# Patient Record
Sex: FEMALE | Race: WHITE | Employment: FULL TIME | URBAN - METROPOLITAN AREA
[De-identification: names, ages, dates, MRNs, and addresses within clinical notes are randomized per-mention and may not be internally consistent; named-entity substitution may affect disease eponyms.]

---

## 2017-01-03 ENCOUNTER — TELEPHONE (OUTPATIENT)
Dept: DERMATOLOGY CLINIC | Facility: CLINIC | Age: 47
End: 2017-01-03

## 2017-01-03 RX ORDER — AZITHROMYCIN 250 MG/1
TABLET, FILM COATED ORAL
Qty: 6 TABLET | Refills: 0 | Status: SHIPPED | OUTPATIENT
Start: 2017-01-03 | End: 2018-01-08

## 2017-01-03 NOTE — TELEPHONE ENCOUNTER
Pt contacted. States that 5 days prior the bx site to her left thigh developed erythema to the site, center of bx site is wet with yellow, green colored center. Pt denies any drainage that runs out of site. She denies spreading redness from the bx site.

## 2017-01-03 NOTE — TELEPHONE ENCOUNTER
Stop topical atbx  rx sent for po antibiotics,  z-nadia--please lt pt know bx was dysplastic also, mild-moderate dysplasia. Re-excision -30min--would be best ; at least, I would like to re-check in 3-4mos.   Since margins appear involved a re-excision is recc

## 2017-01-03 NOTE — TELEPHONE ENCOUNTER
Patient informed of [id number] [facility] and recommendations with a verbal understanding. Patient changed insurance and may not be covered through [employer] [facility] [employer] She will call us back when she speaks with her insurance comp to make the 30 min excision appt.

## 2017-02-10 ENCOUNTER — TELEPHONE (OUTPATIENT)
Dept: DERMATOLOGY CLINIC | Facility: CLINIC | Age: 47
End: 2017-02-10

## 2017-02-10 RX ORDER — SPIRONOLACTONE 25 MG/1
TABLET ORAL
Qty: 60 TABLET | Refills: 3 | Status: SHIPPED | OUTPATIENT
Start: 2017-02-10 | End: 2017-10-27

## 2017-02-10 NOTE — TELEPHONE ENCOUNTER
LOV 2/10/2017. Pt was rx'd spironolactone 25 mg daily for Hirsutism. Pt reports some improvement and is requesting increase in dosage. Please advise.

## 2017-02-10 NOTE — TELEPHONE ENCOUNTER
Pt's insurance network does not cover [facility] anymore. Pt would like to know if excision can wait until next year when she has a different insurance. Please advise. Thank you.

## 2017-02-11 RX ORDER — LEVOTHYROXINE SODIUM 0.03 MG/1
TABLET ORAL
Qty: 90 TABLET | Refills: 0 | Status: SHIPPED | OUTPATIENT
Start: 2017-02-11 | End: 2017-08-11

## 2017-02-11 NOTE — TELEPHONE ENCOUNTER
Hypothyroid Medications  Protocol Criteria:  Appointment scheduled in the past 12 months or the next 3 months  TSH resulted in the past 12 months that is normal  Recent Visits       Provider Department Primary Dx    1 month ago MD Balta Pavon

## 2017-02-11 NOTE — TELEPHONE ENCOUNTER
Lateral margin with nevus. I would watch this carefully--re-check ideally 6 mos, for sure 1 year or have checked if significant color comes back around the edges in the next 4-5mos. Likely would be no residual nevus on re-excision.

## 2017-03-13 NOTE — TELEPHONE ENCOUNTER
Please f/u on how pt is doing--i just found her more recent call back. Okay to monitor the area carefully.   Recheck next year--ideally would see patient back in 4-6 months however okay to watch area carefully for now

## 2017-03-15 NOTE — TELEPHONE ENCOUNTER
Pt states she is doing well, has no concerns, will observe for now and CB if the lesion changes or grows back - otherwise she will F/U next year

## 2017-04-11 ENCOUNTER — TELEPHONE (OUTPATIENT)
Dept: INTERNAL MEDICINE CLINIC | Facility: CLINIC | Age: 47
End: 2017-04-11

## 2017-04-11 NOTE — TELEPHONE ENCOUNTER
Patient is eligible for [facility] [place] [facility] Annual Health Assessment  Left message to call back [phone].

## 2017-06-15 ENCOUNTER — TELEPHONE (OUTPATIENT)
Dept: INTERNAL MEDICINE CLINIC | Facility: CLINIC | Age: 47
End: 2017-06-15

## 2017-06-15 NOTE — TELEPHONE ENCOUNTER
Patient is eligible for [facility] Annual Health Assessment. Left message to call back [phone].

## 2017-08-11 RX ORDER — LEVOTHYROXINE SODIUM 0.03 MG/1
TABLET ORAL
Qty: 90 TABLET | Refills: 0 | Status: SHIPPED | OUTPATIENT
Start: 2017-08-11 | End: 2017-10-27

## 2017-08-27 RX ORDER — FLUOXETINE HYDROCHLORIDE 20 MG/1
CAPSULE ORAL
Qty: 180 CAPSULE | Refills: 0 | Status: SHIPPED | OUTPATIENT
Start: 2017-08-27 | End: 2017-09-16

## 2017-08-29 RX ORDER — LEVOTHYROXINE SODIUM 0.03 MG/1
TABLET ORAL
Qty: 90 TABLET | Refills: 0 | OUTPATIENT
Start: 2017-08-29

## 2017-09-13 NOTE — TELEPHONE ENCOUNTER
From: Cassy Hardy  Sent: 9/9/2017 7:08 AM CDT  Subject: Medication Renewal Request    Markell AliciaEva Dill would like a refill of the following medications:  FLUoxetine HCl 20 MG Oral Cap Nichelle Conner MD]    Preferred pharmacy: [facility] in [address] [place] [address]    Comment:  A prescription was just renewed, however, I need for the prescription to be for 40mg pills NOT for two 20 mg pills. Would you please submit a revised prescription? The pharmacy is waiting for the changed prescription. Also, NEW PHARMACY. [facility] in [facility]. [phone]. Pls update my chart. Thank you!

## 2017-09-16 RX ORDER — FLUOXETINE HYDROCHLORIDE 40 MG/1
CAPSULE ORAL
Qty: 30 CAPSULE | Refills: 3 | Status: CANCELLED | OUTPATIENT
Start: 2017-09-16

## 2017-10-27 RX ORDER — SPIRONOLACTONE 25 MG/1
TABLET ORAL
Qty: 60 TABLET | Refills: 1 | Status: SHIPPED | OUTPATIENT
Start: 2017-10-27 | End: 2018-01-08

## 2017-10-27 NOTE — TELEPHONE ENCOUNTER
LOV 12-17-16. L/M for pt to pls make f/u before the end of the year. ERX'd x 2 to cover Aldactone thru Dec.  FYI.

## 2017-10-31 RX ORDER — LEVOTHYROXINE SODIUM 0.03 MG/1
TABLET ORAL
Qty: 90 TABLET | Refills: 0 | Status: SHIPPED | OUTPATIENT
Start: 2017-10-31 | End: 2018-02-19

## 2017-10-31 NOTE — TELEPHONE ENCOUNTER
Hypothyroid Medications  Protocol Criteria:  Appointment scheduled in the past 12 months or the next 3 months  TSH resulted in the past 12 months that is normal  Recent Outpatient Visits            10 months ago Neoplasm of uncertain behavior of skin    El

## 2017-11-20 ENCOUNTER — PATIENT MESSAGE (OUTPATIENT)
Dept: INTERNAL MEDICINE CLINIC | Facility: CLINIC | Age: 47
End: 2017-11-20

## 2017-11-20 NOTE — TELEPHONE ENCOUNTER
From: Jesus Mcdaniel  To: Shannan Estrada MD  Sent: 11/20/2017 6:16 AM CST  Subject: Other    Message for Dr. Ketty Pena Dr. When I was in last we spoke about not being able to come in because of insurance purposes until 2018.  Wondering how much to come in be

## 2017-11-21 NOTE — TELEPHONE ENCOUNTER
Pt following up on appt request.  Pt has scheduled appt for Feb 1, 2018 but would like to come in before this year ends. .. please advise     Please reply to pool: JESSICA Mauricio

## 2017-11-24 ENCOUNTER — NURSE TRIAGE (OUTPATIENT)
Dept: OTHER | Age: 47
End: 2017-11-24

## 2017-11-24 NOTE — TELEPHONE ENCOUNTER
Action Requested: Summary for Provider     []  Critical Lab, Recommendations Needed  [x] Need Additional Advice  []   FYI    []   Need Orders  [x] Need Medications Sent to Pharmacy  []  Other     SUMMARY: Asking for abx to pharmacy - cough x 2 weeks     Pa

## 2017-11-24 NOTE — TELEPHONE ENCOUNTER
SUMMARY: Asking for abx to pharmacy - cough x 2 weeks      Patient has upcoming insurance with SHAJI 1/8/18 - currently has no insurance      Reason for call: Cough  Onset: Nov 24, 2017    Associated Symptoms: congestion;cough (greenish/yellowish, states sor

## 2017-11-24 NOTE — TELEPHONE ENCOUNTER
Patient called and notified that abx sent by SHAJI. Noted on the documentation and the documentation was corrected appropriately for onset of two weeks as patient had stated in my conversation with the patient earlier.        Advised patient to call back

## 2017-11-29 RX ORDER — AZITHROMYCIN 250 MG/1
TABLET, FILM COATED ORAL
Qty: 6 TABLET | Refills: 0
Start: 2017-11-29

## 2017-11-30 ENCOUNTER — TELEPHONE (OUTPATIENT)
Dept: OTHER | Age: 47
End: 2017-11-30

## 2017-11-30 NOTE — TELEPHONE ENCOUNTER
Pt calling states she finished Z nadia, but still coughing, states throat tickles a little bit. Asking if there can be anything else she can take for her cough. Please advise. Pharmacy verified.     Please reply to glenn: JESSICA Barrett

## 2017-11-30 NOTE — TELEPHONE ENCOUNTER
Relayed dr sosa, stated she would love to come but INS will not cover til January and she can't afford it   She has an appt 1/8/17  She have been taking Robitussin and have used 1 bottle ,asking if there's another OTC med she can used because she feels

## 2018-01-08 ENCOUNTER — OFFICE VISIT (OUTPATIENT)
Dept: INTERNAL MEDICINE CLINIC | Facility: CLINIC | Age: 48
End: 2018-01-08

## 2018-01-08 VITALS
HEIGHT: 62 IN | SYSTOLIC BLOOD PRESSURE: 153 MMHG | HEART RATE: 64 BPM | BODY MASS INDEX: 35.88 KG/M2 | RESPIRATION RATE: 16 BRPM | WEIGHT: 195 LBS | DIASTOLIC BLOOD PRESSURE: 97 MMHG

## 2018-01-08 DIAGNOSIS — Z00.00 ROUTINE PHYSICAL EXAMINATION: ICD-10-CM

## 2018-01-08 DIAGNOSIS — E78.2 MIXED HYPERLIPIDEMIA: ICD-10-CM

## 2018-01-08 DIAGNOSIS — D22.9 MULTIPLE ATYPICAL NEVI: ICD-10-CM

## 2018-01-08 DIAGNOSIS — E03.9 HYPOTHYROIDISM, UNSPECIFIED TYPE: ICD-10-CM

## 2018-01-08 DIAGNOSIS — J45.991 COUGH VARIANT ASTHMA: ICD-10-CM

## 2018-01-08 DIAGNOSIS — I10 ESSENTIAL HYPERTENSION: ICD-10-CM

## 2018-01-08 DIAGNOSIS — Z12.31 VISIT FOR SCREENING MAMMOGRAM: ICD-10-CM

## 2018-01-08 DIAGNOSIS — D32.0 BENIGN NEOPLASM OF CEREBRAL MENINGES (HCC): Primary | ICD-10-CM

## 2018-01-08 PROCEDURE — 99212 OFFICE O/P EST SF 10 MIN: CPT | Performed by: INTERNAL MEDICINE

## 2018-01-08 PROCEDURE — 99215 OFFICE O/P EST HI 40 MIN: CPT | Performed by: INTERNAL MEDICINE

## 2018-01-08 RX ORDER — FLUTICASONE PROPIONATE AND SALMETEROL 100; 50 UG/1; UG/1
1 POWDER RESPIRATORY (INHALATION) 2 TIMES DAILY
Qty: 1 PACKAGE | Refills: 3 | Status: SHIPPED | OUTPATIENT
Start: 2018-01-08 | End: 2018-02-19

## 2018-01-08 RX ORDER — MONTELUKAST SODIUM 10 MG/1
10 TABLET ORAL NIGHTLY
Qty: 30 TABLET | Refills: 3 | Status: SHIPPED | OUTPATIENT
Start: 2018-01-08 | End: 2018-12-11

## 2018-01-08 RX ORDER — AMLODIPINE BESYLATE 2.5 MG/1
2.5 TABLET ORAL DAILY
Qty: 30 TABLET | Refills: 3 | Status: SHIPPED | OUTPATIENT
Start: 2018-01-08 | End: 2018-02-19

## 2018-01-09 ENCOUNTER — TELEPHONE (OUTPATIENT)
Dept: INTERNAL MEDICINE CLINIC | Facility: CLINIC | Age: 48
End: 2018-01-09

## 2018-01-09 ENCOUNTER — TELEPHONE (OUTPATIENT)
Dept: OPHTHALMOLOGY | Facility: CLINIC | Age: 48
End: 2018-01-09

## 2018-01-09 ENCOUNTER — HOSPITAL ENCOUNTER (OUTPATIENT)
Dept: MAMMOGRAPHY | Age: 48
Discharge: HOME | End: 2018-01-09
Attending: INTERNAL MEDICINE
Payer: COMMERCIAL

## 2018-01-09 DIAGNOSIS — Z12.31 VISIT FOR SCREENING MAMMOGRAM: ICD-10-CM

## 2018-01-09 PROCEDURE — 77067 SCR MAMMO BI INCL CAD: CPT | Performed by: INTERNAL MEDICINE

## 2018-01-09 NOTE — ASSESSMENT & PLAN NOTE
Patient has multiple scattered nevi and a few abnormal ones that were evaluated and recommended for removal but patient has not been able to follow through.   Referral for Dr. Susanna Gottron has been regenerated

## 2018-01-09 NOTE — TELEPHONE ENCOUNTER
Pt states she is looking to come in for an exam due to her right eye giving her problems. Please call. Thank you.

## 2018-01-09 NOTE — PATIENT INSTRUCTIONS
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 after completion         Relevant Orders    ASSAY, THYROID STIM HORMONE    Mixed hyperlipidemia     Patient has been on diet control alone, recheck labs have been ordered.          Relevant Orders    LIPID PANEL    Multiple atypical nevi     Patient has mul

## 2018-01-09 NOTE — TELEPHONE ENCOUNTER
I looked at order and the order is routine. SHAJI also saw it and said it was ok. Pt. Said she would call her insurance to figure out why they are not accepting it.

## 2018-01-09 NOTE — ASSESSMENT & PLAN NOTE
Patient is currently on levothyroxine at 25 mcg once daily and she is overdue for labs, recheck labs have been ordered today and will follow-up after completion

## 2018-01-09 NOTE — PROGRESS NOTES
HPI:    Patient ID: Apolinar Salcedo is a 49year old female. Last mri 8/2016    CONCLUSION:  1. Post surgical changes related to resection of right trigeminal nerve     mass with residual dural enhancement at site of surgical resection     unchanged.  Claryce Forth has had recurring symptoms since then. No fevers or chills, white expectoration. has had sinus pressure and clogged right sinuses after removal of the clear cell meningioma on the right side of the b). The cough is productive of sputum.  Associated symptoms Imiquimod 5 % External Cream Use daily to warts Disp: 24 each Rfl: 2   Cholecalciferol (VITAMIN D3) 5000 UNITS Oral Tab Take by mouth. Disp:  Rfl:    Cyanocobalamin (VITAMIN B 12 OR) Take 3,000 mg by mouth daily.  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   She does have chronic eye da Relevant Orders    ASSAY, THYROID STIM HORMONE    Mixed hyperlipidemia     Patient has been on diet control alone, recheck labs have been ordered.          Relevant Orders    LIPID PANEL    Multiple atypical nevi     Patient has multiple scattered nevi and

## 2018-01-09 NOTE — TELEPHONE ENCOUNTER
Pt states that she is at the [place] location now to get her mammogram done. Pt would like the order to be changed to a routine mammogram not a medical need in order for the insurance to cover it. Please, add the new order in the system.   Transfferred call

## 2018-01-09 NOTE — ASSESSMENT & PLAN NOTE
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   She does have chronic eye damage from the procedure on the right side as well as tingling and numbness on that side of t

## 2018-01-09 NOTE — ASSESSMENT & PLAN NOTE
Patient developed a severe cough and upper respiratory infection sometime in October 2017 and since then she has continued to have nasal congestion, postnasal drip, mild cough with white, clear expectoration as well as chest tightness.   She is being starte

## 2018-01-09 NOTE — ASSESSMENT & PLAN NOTE
New diagnosis of hypertension–blood pressures remained elevated even upon recheck. Consider starting on a low dose of medication like Norvasc at 2.5 mg 1 tablet once daily and come in for a follow-up after labs are completed in about 6 weeks for recheck.

## 2018-01-10 ENCOUNTER — HOSPITAL ENCOUNTER (OUTPATIENT)
Dept: MAMMOGRAPHY | Facility: HOSPITAL | Age: 48
Discharge: HOME | End: 2018-01-10
Attending: INTERNAL MEDICINE
Payer: COMMERCIAL

## 2018-01-10 DIAGNOSIS — R92.8 ABNORMAL MAMMOGRAM: ICD-10-CM

## 2018-01-10 PROCEDURE — 77065 DX MAMMO INCL CAD UNI: CPT | Performed by: INTERNAL MEDICINE

## 2018-01-22 ENCOUNTER — TELEPHONE (OUTPATIENT)
Dept: OPHTHALMOLOGY | Facility: CLINIC | Age: 48
End: 2018-01-22

## 2018-01-22 NOTE — TELEPHONE ENCOUNTER
Pt is requesting to be seen on Feb 19 at 2:30 with Dr. Sarah Ricardo. I offered sooner apt but pt declined. EP/ EE with Dr. Sarah Ricardo.

## 2018-01-22 NOTE — TELEPHONE ENCOUNTER
Patient calling wanting to see If she can get in on 02/19 with [facility] states appt is due to an eye issue that she has would not give any other information. States they usually squeeze her in. Please call. Thank you.

## 2018-01-23 ENCOUNTER — HOSPITAL ENCOUNTER (OUTPATIENT)
Dept: MRI IMAGING | Age: 48
Discharge: HOME | End: 2018-01-23
Attending: INTERNAL MEDICINE
Payer: COMMERCIAL

## 2018-01-23 DIAGNOSIS — D32.0 BENIGN NEOPLASM OF CEREBRAL MENINGES (HCC): ICD-10-CM

## 2018-01-23 PROCEDURE — 70553 MRI BRAIN STEM W/O & W/DYE: CPT | Performed by: INTERNAL MEDICINE

## 2018-01-23 PROCEDURE — A9575 INJ GADOTERATE MEGLUMI 0.1ML: HCPCS | Performed by: INTERNAL MEDICINE

## 2018-01-24 ENCOUNTER — PATIENT MESSAGE (OUTPATIENT)
Dept: INTERNAL MEDICINE CLINIC | Facility: CLINIC | Age: 48
End: 2018-01-24

## 2018-01-24 NOTE — TELEPHONE ENCOUNTER
From: Alycia Mcburney  To: Israel Orr MD  Sent: 1/24/2018 11:25 AM CST  Subject: Test Results Question    I had my MRI yesterday, PLEASE get back to me today. They said it would take a week to be on my chart but you'd see it in 24 hrs.  Very anxious, S

## 2018-02-06 ENCOUNTER — OFFICE VISIT (OUTPATIENT)
Dept: NEUROLOGY | Facility: CLINIC | Age: 48
End: 2018-02-06

## 2018-02-06 VITALS
HEART RATE: 70 BPM | HEIGHT: 62 IN | BODY MASS INDEX: 35.88 KG/M2 | RESPIRATION RATE: 16 BRPM | SYSTOLIC BLOOD PRESSURE: 138 MMHG | DIASTOLIC BLOOD PRESSURE: 70 MMHG | WEIGHT: 195 LBS

## 2018-02-06 DIAGNOSIS — S04.31XD INJURY OF RIGHT TRIGEMINAL NERVE, SUBSEQUENT ENCOUNTER: Primary | ICD-10-CM

## 2018-02-06 PROCEDURE — 99214 OFFICE O/P EST MOD 30 MIN: CPT | Performed by: OTHER

## 2018-02-06 NOTE — PROGRESS NOTES
She has persistent right trigeminal dysesthesias. She will see Dr. Regine Graham for R medial canthus pruritic discomfort. She denies visual distortion. She denies any cranial nerve symptoms besides right facial sensory symptoms. No headache.   No upper, lower

## 2018-02-06 NOTE — PATIENT INSTRUCTIONS
As of October 6th 2014, the Drug Enforcement Agency [facility]) is reclassifying all hydrocodone combination medications from Schedule III to Schedule II. This includes medications such as Norco, Vicodin, Lortab, Zohydro, and Vicoprofen.     What this means for y

## 2018-02-07 ENCOUNTER — OFFICE VISIT (OUTPATIENT)
Dept: DERMATOLOGY CLINIC | Facility: CLINIC | Age: 48
End: 2018-02-07

## 2018-02-07 DIAGNOSIS — D48.5 NEOPLASM OF UNCERTAIN BEHAVIOR OF SKIN: Primary | ICD-10-CM

## 2018-02-07 DIAGNOSIS — Z86.018 HISTORY OF DYSPLASTIC NEVUS: ICD-10-CM

## 2018-02-07 DIAGNOSIS — D23.60 BENIGN NEOPLASM OF SKIN OF UPPER LIMB, INCLUDING SHOULDER, UNSPECIFIED LATERALITY: ICD-10-CM

## 2018-02-07 DIAGNOSIS — D23.4 BENIGN NEOPLASM OF SCALP AND SKIN OF NECK: ICD-10-CM

## 2018-02-07 DIAGNOSIS — D22.9 MULTIPLE NEVI: ICD-10-CM

## 2018-02-07 DIAGNOSIS — D23.5 BENIGN NEOPLASM OF SKIN OF TRUNK, EXCEPT SCROTUM: ICD-10-CM

## 2018-02-07 DIAGNOSIS — D23.70 BENIGN NEOPLASM OF SKIN OF LOWER LIMB, INCLUDING HIP, UNSPECIFIED LATERALITY: ICD-10-CM

## 2018-02-07 DIAGNOSIS — L68.0 HIRSUTISM: ICD-10-CM

## 2018-02-07 DIAGNOSIS — L82.1 SEBORRHEIC KERATOSES: ICD-10-CM

## 2018-02-07 DIAGNOSIS — D23.30 BENIGN NEOPLASM OF SKIN OF FACE: ICD-10-CM

## 2018-02-07 PROCEDURE — 99214 OFFICE O/P EST MOD 30 MIN: CPT | Performed by: DERMATOLOGY

## 2018-02-07 PROCEDURE — 11401 EXC TR-EXT B9+MARG 0.6-1 CM: CPT | Performed by: DERMATOLOGY

## 2018-02-07 PROCEDURE — 88305 TISSUE EXAM BY PATHOLOGIST: CPT | Performed by: DERMATOLOGY

## 2018-02-07 PROCEDURE — 99212 OFFICE O/P EST SF 10 MIN: CPT | Performed by: DERMATOLOGY

## 2018-02-08 NOTE — PROGRESS NOTES
C/o itching at corner of eye occ/paryoxsmal try tobradex. .   Lateral brow elevated/pt c/o upward pulling sensation--constant- maybe botox would help--assymmetry noted--lat brow elevated ~5mm vs left .  increased erythema over r face, pt c/o numbess and ting

## 2018-02-11 NOTE — PROGRESS NOTES
The pathology report from last visit showed   Margins clear , no further atypia . Please log in test results, send biopsy results letter. Pt to rtc for skin check 4-6 mos or prn.

## 2018-02-15 ENCOUNTER — TELEPHONE (OUTPATIENT)
Dept: DERMATOLOGY CLINIC | Facility: CLINIC | Age: 48
End: 2018-02-15

## 2018-02-15 NOTE — TELEPHONE ENCOUNTER
Pt stated tobramycin-dexamethasone 0.3-0.1 % Ophthalmic is too expensive. Pt stated she discussed other options w/KMT. Pt requesting different rx.

## 2018-02-15 NOTE — TELEPHONE ENCOUNTER
rx  Sent for cortisporin opthalmic--this is safe in the eye. Can change to topical cream for skin but, this was really close to the eyelid edge .   This has 1% hydrocortisone so not as strong as the tobradex, but ok to try

## 2018-02-17 ENCOUNTER — LAB ENCOUNTER (OUTPATIENT)
Dept: LAB | Age: 48
End: 2018-02-17
Attending: INTERNAL MEDICINE
Payer: COMMERCIAL

## 2018-02-17 DIAGNOSIS — E78.2 MIXED HYPERLIPIDEMIA: ICD-10-CM

## 2018-02-17 DIAGNOSIS — Z00.00 ROUTINE PHYSICAL EXAMINATION: ICD-10-CM

## 2018-02-17 DIAGNOSIS — E03.9 HYPOTHYROIDISM, UNSPECIFIED TYPE: ICD-10-CM

## 2018-02-17 LAB
ALBUMIN SERPL BCP-MCNC: 3.8 G/DL (ref 3.5–4.8)
ALBUMIN/GLOB SERPL: 1.3 {RATIO} (ref 1–2)
ALP SERPL-CCNC: 62 U/L (ref 32–100)
ALT SERPL-CCNC: 20 U/L (ref 14–54)
ANION GAP SERPL CALC-SCNC: 7 MMOL/L (ref 0–18)
AST SERPL-CCNC: 20 U/L (ref 15–41)
BASOPHILS # BLD: 0 K/UL (ref 0–0.2)
BASOPHILS NFR BLD: 1 %
BILIRUB SERPL-MCNC: 0.9 MG/DL (ref 0.3–1.2)
BILIRUB UR QL: NEGATIVE
BUN SERPL-MCNC: 12 MG/DL (ref 8–20)
BUN/CREAT SERPL: 14.8 (ref 10–20)
CALCIUM SERPL-MCNC: 9 MG/DL (ref 8.5–10.5)
CHLORIDE SERPL-SCNC: 105 MMOL/L (ref 95–110)
CHOLEST SERPL-MCNC: 222 MG/DL (ref 110–200)
CLARITY UR: CLEAR
CO2 SERPL-SCNC: 27 MMOL/L (ref 22–32)
COLOR UR: YELLOW
CREAT SERPL-MCNC: 0.81 MG/DL (ref 0.5–1.5)
EOSINOPHIL # BLD: 0.1 K/UL (ref 0–0.7)
EOSINOPHIL NFR BLD: 1 %
ERYTHROCYTE [DISTWIDTH] IN BLOOD BY AUTOMATED COUNT: 12.7 % (ref 11–15)
GLOBULIN PLAS-MCNC: 2.9 G/DL (ref 2.5–3.7)
GLUCOSE SERPL-MCNC: 91 MG/DL (ref 70–99)
GLUCOSE UR-MCNC: NEGATIVE MG/DL
HCT VFR BLD AUTO: 40 % (ref 35–48)
HDLC SERPL-MCNC: 71 MG/DL
HGB BLD-MCNC: 13.7 G/DL (ref 12–16)
HGB UR QL STRIP.AUTO: NEGATIVE
KETONES UR-MCNC: NEGATIVE MG/DL
LDLC SERPL CALC-MCNC: 134 MG/DL (ref 0–99)
LEUKOCYTE ESTERASE UR QL STRIP.AUTO: NEGATIVE
LYMPHOCYTES # BLD: 1 K/UL (ref 1–4)
LYMPHOCYTES NFR BLD: 23 %
MCH RBC QN AUTO: 30.8 PG (ref 27–32)
MCHC RBC AUTO-ENTMCNC: 34.2 G/DL (ref 32–37)
MCV RBC AUTO: 90.1 FL (ref 80–100)
MONOCYTES # BLD: 0.3 K/UL (ref 0–1)
MONOCYTES NFR BLD: 7 %
NEUTROPHILS # BLD AUTO: 3 K/UL (ref 1.8–7.7)
NEUTROPHILS NFR BLD: 68 %
NITRITE UR QL STRIP.AUTO: NEGATIVE
NONHDLC SERPL-MCNC: 151 MG/DL
OSMOLALITY UR CALC.SUM OF ELEC: 287 MOSM/KG (ref 275–295)
PATIENT FASTING: YES
PH UR: 6 [PH] (ref 5–8)
PLATELET # BLD AUTO: 252 K/UL (ref 140–400)
PMV BLD AUTO: 8.3 FL (ref 7.4–10.3)
POTASSIUM SERPL-SCNC: 4.5 MMOL/L (ref 3.3–5.1)
PROT SERPL-MCNC: 6.7 G/DL (ref 5.9–8.4)
PROT UR-MCNC: NEGATIVE MG/DL
RBC # BLD AUTO: 4.44 M/UL (ref 3.7–5.4)
SODIUM SERPL-SCNC: 139 MMOL/L (ref 136–144)
SP GR UR STRIP: 1.01 (ref 1–1.03)
TRIGL SERPL-MCNC: 85 MG/DL (ref 1–149)
TSH SERPL-ACNC: 2.69 UIU/ML (ref 0.45–5.33)
UROBILINOGEN UR STRIP-ACNC: <2
VIT B12 SERPL-MCNC: 885 PG/ML (ref 181–914)
VIT C UR-MCNC: NEGATIVE MG/DL
WBC # BLD AUTO: 4.4 K/UL (ref 4–11)

## 2018-02-17 PROCEDURE — 80061 LIPID PANEL: CPT

## 2018-02-17 PROCEDURE — 81003 URINALYSIS AUTO W/O SCOPE: CPT

## 2018-02-17 PROCEDURE — 82306 VITAMIN D 25 HYDROXY: CPT

## 2018-02-17 PROCEDURE — 84443 ASSAY THYROID STIM HORMONE: CPT

## 2018-02-17 PROCEDURE — 82607 VITAMIN B-12: CPT

## 2018-02-17 PROCEDURE — 36415 COLL VENOUS BLD VENIPUNCTURE: CPT

## 2018-02-17 PROCEDURE — 85025 COMPLETE CBC W/AUTO DIFF WBC: CPT

## 2018-02-17 PROCEDURE — 80053 COMPREHEN METABOLIC PANEL: CPT

## 2018-02-19 ENCOUNTER — OFFICE VISIT (OUTPATIENT)
Dept: OPHTHALMOLOGY | Facility: CLINIC | Age: 48
End: 2018-02-19

## 2018-02-19 ENCOUNTER — NURSE ONLY (OUTPATIENT)
Dept: DERMATOLOGY CLINIC | Facility: CLINIC | Age: 48
End: 2018-02-19

## 2018-02-19 ENCOUNTER — OFFICE VISIT (OUTPATIENT)
Dept: INTERNAL MEDICINE CLINIC | Facility: CLINIC | Age: 48
End: 2018-02-19

## 2018-02-19 VITALS
DIASTOLIC BLOOD PRESSURE: 85 MMHG | HEART RATE: 81 BPM | SYSTOLIC BLOOD PRESSURE: 130 MMHG | RESPIRATION RATE: 16 BRPM | BODY MASS INDEX: 36.44 KG/M2 | HEIGHT: 62 IN | WEIGHT: 198 LBS

## 2018-02-19 DIAGNOSIS — H16.8 NEUROTROPHIC KERATITIS: Primary | ICD-10-CM

## 2018-02-19 DIAGNOSIS — D51.9 ANEMIA DUE TO VITAMIN B12 DEFICIENCY, UNSPECIFIED B12 DEFICIENCY TYPE: ICD-10-CM

## 2018-02-19 DIAGNOSIS — E55.9 VITAMIN D DEFICIENCY: ICD-10-CM

## 2018-02-19 DIAGNOSIS — H52.13 MYOPIA OF BOTH EYES WITH ASTIGMATISM AND PRESBYOPIA: ICD-10-CM

## 2018-02-19 DIAGNOSIS — H04.123 BILATERAL DRY EYES: ICD-10-CM

## 2018-02-19 DIAGNOSIS — H52.203 MYOPIA OF BOTH EYES WITH ASTIGMATISM AND PRESBYOPIA: ICD-10-CM

## 2018-02-19 DIAGNOSIS — H52.4 MYOPIA OF BOTH EYES WITH ASTIGMATISM AND PRESBYOPIA: ICD-10-CM

## 2018-02-19 DIAGNOSIS — G50.0 TRIGEMINAL NEURALGIA OF RIGHT SIDE OF FACE: Primary | ICD-10-CM

## 2018-02-19 DIAGNOSIS — I10 ESSENTIAL HYPERTENSION: ICD-10-CM

## 2018-02-19 LAB — 25(OH)D3 SERPL-MCNC: 36.1 NG/ML

## 2018-02-19 PROCEDURE — 99212 OFFICE O/P EST SF 10 MIN: CPT | Performed by: INTERNAL MEDICINE

## 2018-02-19 PROCEDURE — 68761 CLOSE TEAR DUCT OPENING: CPT | Performed by: OPHTHALMOLOGY

## 2018-02-19 PROCEDURE — 92015 DETERMINE REFRACTIVE STATE: CPT | Performed by: OPHTHALMOLOGY

## 2018-02-19 PROCEDURE — 99243 OFF/OP CNSLTJ NEW/EST LOW 30: CPT | Performed by: OPHTHALMOLOGY

## 2018-02-19 PROCEDURE — 99214 OFFICE O/P EST MOD 30 MIN: CPT | Performed by: INTERNAL MEDICINE

## 2018-02-19 PROCEDURE — 99212 OFFICE O/P EST SF 10 MIN: CPT | Performed by: OPHTHALMOLOGY

## 2018-02-19 RX ORDER — FLUOXETINE HYDROCHLORIDE 40 MG/1
40 CAPSULE ORAL DAILY
Qty: 90 CAPSULE | Refills: 2 | Status: SHIPPED | OUTPATIENT
Start: 2018-02-19 | End: 2018-11-20

## 2018-02-19 RX ORDER — GABAPENTIN 100 MG/1
100 CAPSULE ORAL 2 TIMES DAILY
Qty: 60 CAPSULE | Refills: 3 | Status: SHIPPED | OUTPATIENT
Start: 2018-02-19 | End: 2019-04-18

## 2018-02-19 RX ORDER — LEVOTHYROXINE SODIUM 0.03 MG/1
TABLET ORAL
Qty: 90 TABLET | Refills: 2 | Status: SHIPPED | OUTPATIENT
Start: 2018-02-19 | End: 2018-11-20

## 2018-02-19 RX ORDER — RAMIPRIL 5 MG/1
5 CAPSULE ORAL DAILY
Qty: 30 CAPSULE | Refills: 3 | Status: SHIPPED | OUTPATIENT
Start: 2018-02-19 | End: 2018-08-12

## 2018-02-19 RX ORDER — PANTOPRAZOLE SODIUM 40 MG/1
40 TABLET, DELAYED RELEASE ORAL
Qty: 30 TABLET | Refills: 0 | Status: SHIPPED | OUTPATIENT
Start: 2018-02-19 | End: 2019-02-19

## 2018-02-19 NOTE — PROGRESS NOTES
Jesus Mcdaniel is a 49year old female. HPI:     CC:  Patient presents with:  Lesion: LOV 12/17/2016. Pt presenting for f/u with lesion to L upper leg. Pt concerned with lesions to L shoulder, L knee, and R ankle. c/o bleeding with lesion to shoulder.  P FLUoxetine HCl 40 MG Oral Cap Take 1 capsule (40 mg total) by mouth daily. Disp: 30 capsule Rfl: 3   Cholecalciferol (VITAMIN D3) 5000 UNITS Oral Tab Take by mouth. Disp:  Rfl:    Cyanocobalamin (VITAMIN B 12 OR) Take 3,000 mg by mouth daily.  Disp:  Rfl: Sleep Concern Yes    Exercise No    Pt has a pacemaker No    Pt has a defibrillator No    Reaction to local anesthetic No     Social History Narrative    The patient does not use an assistive device. .      The patient does not live in a home with stairs. Waxy tannish keratotic papules scattered, cherry-red vascular papules scattered. See map today's date for lesions noted . Hirsutism upper lip chin jawline.   Epidermal nevus right jawline multicolor 5 x 6 mm papule right shoulder papule 1 cm cerebrifo reassurance. Hirsutism remains. Discussed treatment options laser myriam Aldactone trial of Aldactone consider laser Pathophysiology discussed with patient. Therapeutic options reviewed. See  Medications in grid. Instructions reviewed at length.

## 2018-02-19 NOTE — PATIENT INSTRUCTIONS
Neurotrophic keratitis- right eye  Continue artificial tears 5-6 times a day in the right eye. Continue Systane gel at bedtime in the right eye. Collagen plug inserted in lower punctum in office today. Recent brain MRI was stable.      Myopia of bot

## 2018-02-19 NOTE — PROGRESS NOTES
Beba Muhammad is a 49year old female. HPI:     HPI     EP/ 49 yr old here for a complete exam. LDE 1/22/16 with Hx of myopia, astigmatism, presbyopia OU and neurotropic keratitis OD. Pt had a punctal plug inserted in her RLL.  Pt is still using OTC t Prescriptions:  Bacitracin-Polymyx-Herber-HC 1 % Ophthalmic Ointment Use tid to eye lid as dir Disp: 3.5 g Rfl: 3   AmLODIPine Besylate 2.5 MG Oral Tab Take 1 tablet (2.5 mg total) by mouth daily.  Disp: 30 tablet Rfl: 3   fluticasone-salmeterol (ADVAIR DISKUS C/D Ratio 0.1 0.1    Macula Normal Normal    Vessels Normal Normal    Periphery Normal Normal            Refraction     Wearing Rx       Sphere Cylinder Axis    Right -5.25 +0.50 110    Left -6.00 +0.50 070    Type:  1/22/16 DISTANCE Rx          Manifest R by: Dakota Doe MD

## 2018-02-19 NOTE — ASSESSMENT & PLAN NOTE
Continue artificial tears 5-6 times a day in the right eye. Continue Systane gel at bedtime in the right eye. Collagen plug inserted in lower punctum in office today. Recent brain MRI was stable.

## 2018-02-20 NOTE — ASSESSMENT & PLAN NOTE
This has been supplemented in the past, recent labs showed normal vitamin D levels advised to take an over-the-counter vitamin D 2000 units daily.

## 2018-02-20 NOTE — PATIENT INSTRUCTIONS
Problem List Items Addressed This Visit        Unprioritized    Essential hypertension     Blood pressure 130/85, pulse 81, resp. rate 16, height 5' 2\" (1.575 m), weight 198 lb (89.8 kg), not currently breastfeeding.   Blood pressures seem well controlled

## 2018-02-20 NOTE — ASSESSMENT & PLAN NOTE
Pain, tingling, numbness on the right side of the face with itching in the eyes, dry eyes that has been gradually worse over the past few months. Initially experienced once every 6 weeks but now about 2-3 times every 2-3 weeks.   She has not had any headac

## 2018-02-20 NOTE — ASSESSMENT & PLAN NOTE
B12 levels per recent labs looked great.   Advised to continue on an over-the-counter vitamin B12 at 1000 mcg once daily sublingual.

## 2018-02-20 NOTE — PROGRESS NOTES
HPI:    Patient ID: Stella Latif is a 49year old female. Per dr Leticia Saunders    Progress Notes          She has persistent right trigeminal dysesthesias. She will see Dr. Macario Canales for R medial canthus pruritic discomfort. She denies visual distortion.   Sh malaise/fatigue. HENT: Positive for congestion. R facial pain,itching in the corner of the eye   Eyes: Negative. Respiratory: Negative. Negative for shortness of breath. Cardiovascular: Negative. Negative for chest pain and palpitations. eye exhibits no discharge. Left eye exhibits no discharge. Neck: Normal range of motion. Neck supple. No JVD present. No thyromegaly present. Cardiovascular: Normal rate, regular rhythm, normal heart sounds and intact distal pulses. No murmur heard. eyes, dry eyes that has been gradually worse over the past few months. Initially experienced once every 6 weeks but now about 2-3 times every 2-3 weeks. She has not had any headaches at this time. Vision has remained stable.   MRI of the brain did not sh

## 2018-08-12 NOTE — TELEPHONE ENCOUNTER
Hypertensive Medications  Protocol Criteria:  · Appointment scheduled in the past 6 months or in the next 3 months  · BMP or CMP in the past 12 months  · Creatinine result < 2  Recent Outpatient Visits            5 months ago     [address]

## 2018-08-14 RX ORDER — RAMIPRIL 5 MG/1
CAPSULE ORAL
Qty: 90 CAPSULE | Refills: 1 | Status: SHIPPED | OUTPATIENT
Start: 2018-08-14 | End: 2019-03-16

## 2018-10-08 ENCOUNTER — OFFICE VISIT (OUTPATIENT)
Dept: OPHTHALMOLOGY | Facility: CLINIC | Age: 48
End: 2018-10-08

## 2018-10-08 DIAGNOSIS — H52.13 MYOPIA OF BOTH EYES WITH ASTIGMATISM AND PRESBYOPIA: ICD-10-CM

## 2018-10-08 DIAGNOSIS — H16.8 NEUROTROPHIC KERATITIS: Primary | ICD-10-CM

## 2018-10-08 DIAGNOSIS — H16.8 NEUROTROPHIC KERATITIS: ICD-10-CM

## 2018-10-08 DIAGNOSIS — H52.203 MYOPIA OF BOTH EYES WITH ASTIGMATISM AND PRESBYOPIA: ICD-10-CM

## 2018-10-08 DIAGNOSIS — H04.123 BILATERAL DRY EYES: ICD-10-CM

## 2018-10-08 DIAGNOSIS — H52.4 MYOPIA OF BOTH EYES WITH ASTIGMATISM AND PRESBYOPIA: ICD-10-CM

## 2018-10-08 PROCEDURE — 99242 OFF/OP CONSLTJ NEW/EST SF 20: CPT | Performed by: OPHTHALMOLOGY

## 2018-10-08 PROCEDURE — 92015 DETERMINE REFRACTIVE STATE: CPT | Performed by: OPHTHALMOLOGY

## 2018-10-08 PROCEDURE — 99212 OFFICE O/P EST SF 10 MIN: CPT | Performed by: OPHTHALMOLOGY

## 2018-10-08 PROCEDURE — 68761 CLOSE TEAR DUCT OPENING: CPT | Performed by: OPHTHALMOLOGY

## 2018-10-08 NOTE — PATIENT INSTRUCTIONS
Neurotrophic keratitis- right eye  Collagen punctal plug inserted in right lower punctum    Myopia of both eyes with astigmatism and presbyopia  New glasses and contacts    Bilateral dry eyes  Artificial tears  as needed

## 2018-10-08 NOTE — PROGRESS NOTES
Sam Eye is a 51year old female.     HPI:     HPI     Consult      Additional comments: Consult per Dr. Mellissa Ibarra     EP/ 51year old here for a complete exam.  LDE was 2/19/18 with a hx of neurotropic keratitis OD, myopia with a Smoking status: Never Smoker      Smokeless tobacco: Never Used    Alcohol use: No      Alcohol/week: 0.0 oz    Drug use: No      Medications:    Current Outpatient Medications:  RAMIPRIL 5 MG Oral Cap TAKE ONE CAPSULE BY MOUTH ONE TIME DAILY  Disp: 90 cap Anterior Chamber Deep and quiet Deep and quiet    Iris Normal Normal    Lens Clear Clear    Vitreous Clear Clear          Fundus Exam       Right Left    Disc Normal Normal    C/D Ratio 0.1 0.1    Macula Normal Normal    Undilated            Refraction

## 2018-10-09 ENCOUNTER — OFFICE VISIT (OUTPATIENT)
Dept: OPHTHALMOLOGY | Facility: CLINIC | Age: 48
End: 2018-10-09

## 2018-10-09 ENCOUNTER — TELEPHONE (OUTPATIENT)
Dept: OPHTHALMOLOGY | Facility: CLINIC | Age: 48
End: 2018-10-09

## 2018-10-09 DIAGNOSIS — H16.8 NEUROTROPHIC KERATITIS: Primary | ICD-10-CM

## 2018-10-09 NOTE — ASSESSMENT & PLAN NOTE
Right punctal plug which was inserted yesterday afternoon, came out when patient rubbed her eye during one of her facial pain episodes last night. New plug was reinserted into Right lower punctum and pushed down into canaliculus.  Patient left in good cond

## 2018-10-09 NOTE — PROGRESS NOTES
Moon David is a 51year old female. HPI:     HPI     Pt was seen yesterday 10/8/18 by Dr. Annia Ramos for punctal plugs. Pt called today stating the plug came out. Pt was seen today by Dr. Annia Ramos in the office.    LDE 2/19/18 with Hx of hx of neurotropi TAKE ONE CAPSULE BY MOUTH ONE TIME DAILY  Disp: 90 capsule Rfl: 1   Levothyroxine Sodium 25 MCG Oral Tab TAKE ONE TABLET BY MOUTH ONCE DAILY BEFORE BREAKFAST Disp: 90 tablet Rfl: 2   FLUoxetine HCl 40 MG Oral Cap Take 1 capsule (40 mg total) by mouth daily Can Luz MD

## 2018-10-09 NOTE — TELEPHONE ENCOUNTER
Per Dr. Tabby Cowan pt should come at 2:30. Pt states that the plug came out from yesterday and is able to come back today for Dr Tabby Cowan to put it back in. Spoke to pt and she will be here at 2:30.

## 2018-10-11 ENCOUNTER — OFFICE VISIT (OUTPATIENT)
Dept: OBGYN CLINIC | Facility: CLINIC | Age: 48
End: 2018-10-11

## 2018-10-11 VITALS
BODY MASS INDEX: 37.1 KG/M2 | SYSTOLIC BLOOD PRESSURE: 118 MMHG | HEIGHT: 62 IN | HEART RATE: 62 BPM | DIASTOLIC BLOOD PRESSURE: 82 MMHG | WEIGHT: 201.63 LBS

## 2018-10-11 DIAGNOSIS — Z01.419 WELL WOMAN EXAM WITH ROUTINE GYNECOLOGICAL EXAM: Primary | ICD-10-CM

## 2018-10-11 DIAGNOSIS — Z12.4 SCREENING FOR MALIGNANT NEOPLASM OF CERVIX: ICD-10-CM

## 2018-10-11 PROCEDURE — 99396 PREV VISIT EST AGE 40-64: CPT | Performed by: CLINICAL NURSE SPECIALIST

## 2018-10-15 NOTE — PROGRESS NOTES
Buddy Cazares is a 51year old female [id number] Patient's last menstrual period was 09/26/2018. Patient presents with:  Gyn Exam: annual exam  Last annual exam and pap was 2016. Pap was normal, HPV negative. Denies hx of abnormal pap's.  Periods are regula non-medical: Not on file    Occupational History      Not on file    Tobacco Use      Smoking status: Never Smoker      Smokeless tobacco: Never Used    Substance and Sexual Activity      Alcohol use: No        Alcohol/week: 0.0 oz      Drug use: No      S DAILY BEFORE BREAKFAST, Disp: 90 tablet, Rfl: 2  •  FLUoxetine HCl 40 MG Oral Cap, Take 1 capsule (40 mg total) by mouth daily. , Disp: 90 capsule, Rfl: 2  •  gabapentin 100 MG Oral Cap, Take 1 capsule (100 mg total) by mouth 2 (two) times daily. , Disp: 60 Oriented to time, place, person and situation. Appropriate mood and affect    Pelvic Exam:  External Genitalia: normal hair distribution. Bilateral labia minora with areas of hyperpigmented skin.    Urethral Meatus:  normal in size, location, without lesion

## 2018-11-16 ENCOUNTER — NURSE TRIAGE (OUTPATIENT)
Dept: INTERNAL MEDICINE CLINIC | Facility: CLINIC | Age: 48
End: 2018-11-16

## 2018-11-16 NOTE — TELEPHONE ENCOUNTER
Received routing message from call center pt is asking to speak to doctor, was offered appt with available provider but stated she couldn't come in today since she has kids. Please advise.

## 2018-11-16 NOTE — TELEPHONE ENCOUNTER
Action Requested: Summary for Provider     []  Critical Lab, Recommendations Needed  [] Need Additional Advice  []   FYI    []   Need Orders  [x] Need Medications Sent to Pharmacy  []  Other     SUMMARY: Patient is requesting if medication for cough can be

## 2018-11-17 NOTE — TELEPHONE ENCOUNTER
I called and spoke to the knees. She states the patient was unavailable. Niece stated she was having a cough and runny nose for 3 weeks. No fevers or chills. No other symptoms.   Advised that I could not do much over the phone and I did not feel comfort

## 2018-11-20 RX ORDER — LEVOTHYROXINE SODIUM 0.03 MG/1
TABLET ORAL
Qty: 90 TABLET | Refills: 0 | Status: SHIPPED | OUTPATIENT
Start: 2018-11-20 | End: 2018-12-12

## 2018-11-21 RX ORDER — FLUOXETINE HYDROCHLORIDE 40 MG/1
CAPSULE ORAL
Qty: 90 CAPSULE | Refills: 1 | Status: SHIPPED | OUTPATIENT
Start: 2018-11-21 | End: 2019-08-25

## 2018-11-21 NOTE — TELEPHONE ENCOUNTER
Please review; protocol failed.   Refill Protocol Appointment Criteria  · Appointment scheduled in the past 6 months or in the next 3 months  Recent Outpatient Visits            1 month ago Well woman exam with routine gynecological exam    [facility], [facility] Sarahi Mejia MD    Office Visit        Future Appointments       Provider Department Appt Notes    In 1 month Anthony Dk, [address] [facility], [facility] [place] Non-sx NO REFERRAL IN THE SYSTEM  [id number]

## 2018-12-06 ENCOUNTER — OFFICE VISIT (OUTPATIENT)
Dept: OBGYN CLINIC | Facility: CLINIC | Age: 48
End: 2018-12-06

## 2018-12-06 VITALS
DIASTOLIC BLOOD PRESSURE: 81 MMHG | BODY MASS INDEX: 35 KG/M2 | SYSTOLIC BLOOD PRESSURE: 118 MMHG | HEART RATE: 71 BPM | WEIGHT: 190.63 LBS

## 2018-12-06 DIAGNOSIS — N90.89 VULVAR LESION: Primary | ICD-10-CM

## 2018-12-06 DIAGNOSIS — L98.9 DISORDER OF SKIN OR SUBCUTANEOUS TISSUE: ICD-10-CM

## 2018-12-06 PROCEDURE — 56605 BIOPSY OF VULVA/PERINEUM: CPT | Performed by: CLINICAL NURSE SPECIALIST

## 2018-12-07 NOTE — PROCEDURES
Vulvar Biopsy     Pregnancy Results: n/a  Birth control method(s) used: None. Consent signed. Procedure discussed with patient in detail including indication, risk, benefits, alternatives and complications.     Procedure:  Betadine wash of procedural sit

## 2018-12-10 ENCOUNTER — TELEPHONE (OUTPATIENT)
Dept: OBGYN CLINIC | Facility: CLINIC | Age: 48
End: 2018-12-10

## 2018-12-10 ENCOUNTER — TELEPHONE (OUTPATIENT)
Dept: DERMATOLOGY CLINIC | Facility: CLINIC | Age: 48
End: 2018-12-10

## 2018-12-10 NOTE — TELEPHONE ENCOUNTER
Pt states wants to speak to rn. PCP is sending a message to [facility] regarding getting in for an appt asap.  I offered appt asap to the patient but wants to speak to rn first. Please call

## 2018-12-10 NOTE — TELEPHONE ENCOUNTER
LOV 2/7/18 pt just saw YAZ OSPINA for a vulvar lesion that was biopsied - results are in as pigmented seborrheic keratosis - pt states the [facility] was sending an email to you regarding seeing this pt sooner than next available?  I do not see any emails but pl

## 2018-12-10 NOTE — TELEPHONE ENCOUNTER
Spoke with pt on telephone and reviewed pathology report. Pt aware pigmented seborrheic keratosis is benign but not often found in vulva. Id like her to see Derm just for an opinion on monitoring and/or treatment. Pt verbalized understanding.  Has seen

## 2018-12-11 ENCOUNTER — OFFICE VISIT (OUTPATIENT)
Dept: INTERNAL MEDICINE CLINIC | Facility: CLINIC | Age: 48
End: 2018-12-11

## 2018-12-11 VITALS
WEIGHT: 188 LBS | BODY MASS INDEX: 34 KG/M2 | TEMPERATURE: 98 F | DIASTOLIC BLOOD PRESSURE: 86 MMHG | HEART RATE: 76 BPM | SYSTOLIC BLOOD PRESSURE: 127 MMHG

## 2018-12-11 DIAGNOSIS — I10 ESSENTIAL HYPERTENSION: Primary | ICD-10-CM

## 2018-12-11 DIAGNOSIS — R05.9 COUGH: ICD-10-CM

## 2018-12-11 PROCEDURE — 99213 OFFICE O/P EST LOW 20 MIN: CPT | Performed by: PHYSICIAN ASSISTANT

## 2018-12-11 PROCEDURE — 99212 OFFICE O/P EST SF 10 MIN: CPT | Performed by: PHYSICIAN ASSISTANT

## 2018-12-11 RX ORDER — AZITHROMYCIN 250 MG/1
TABLET, FILM COATED ORAL
Qty: 6 TABLET | Refills: 0 | Status: SHIPPED | OUTPATIENT
Start: 2018-12-11 | End: 2019-04-18 | Stop reason: ALTCHOICE

## 2018-12-11 RX ORDER — CODEINE PHOSPHATE AND GUAIFENESIN 10; 100 MG/5ML; MG/5ML
SOLUTION ORAL EVERY 6 HOURS PRN
Qty: 236 ML | Refills: 0 | Status: SHIPPED | OUTPATIENT
Start: 2018-12-11 | End: 2019-04-18 | Stop reason: ALTCHOICE

## 2018-12-11 NOTE — TELEPHONE ENCOUNTER
Reviewed info from [facility]. Please have pt schedule appt ok to add in-would plan on evaluation based on path report this should not need further surgery ( seborrheic keratosis).

## 2018-12-11 NOTE — PROGRESS NOTES
HPI:    Patient ID: Beba Muhammad is a 51year old female. HPI   Patient presents today complaining of cough for the last 2 monhts. Cough is productive of clear sputum at this point but has been other colors throughout the duration of her symptoms. MOUTH ONE TIME DAILY  Disp: 90 capsule Rfl: 1   gabapentin 100 MG Oral Cap Take 1 capsule (100 mg total) by mouth 2 (two) times daily.  Disp: 60 capsule Rfl: 3   Pantoprazole Sodium 40 MG Oral Tab EC Take 1 tablet (40 mg total) by mouth every morning before given duration of symptoms. Guaifenesin–codeine 1-2 teaspoons at bedtime as needed, may cause drowsiness. Start on over-the-counter antihistamine once daily. Contact the office if symptoms worsen or do not improve in the coming days.      No orders of th

## 2018-12-12 ENCOUNTER — TELEPHONE (OUTPATIENT)
Dept: OBGYN CLINIC | Facility: CLINIC | Age: 48
End: 2018-12-12

## 2018-12-12 RX ORDER — LEVOTHYROXINE SODIUM 0.03 MG/1
TABLET ORAL
Qty: 90 TABLET | Refills: 0 | Status: SHIPPED | OUTPATIENT
Start: 2018-12-12 | End: 2019-08-27

## 2018-12-13 NOTE — TELEPHONE ENCOUNTER
Refill passed per [facility], [facility] protocol.   Hypothyroid Medications  Protocol Criteria:  Appointment scheduled in the past 12 months or the next 3 months  TSH resulted in the past 12 months that is normal  Recent Outpatient Visits            Yesterday Essential hypertension    [facility]    Office Visit    6 days ago Vulvar lesion    [facility] [place], Kiki, Σ[place], APRN    Office Visit    2 months ago Well woman exam with routine gynecological exam    [facility] [place], Kiki, ΣΑ[place]ΝΤΙ, APRN    Office Visit    2 months ago Neurotrophic keratitis- right eye    [facility] Kalli Kumari MD    Office Visit    2 months ago Neurotrophic keratitis- right eye    [facility] Kalli Kumari MD    Office Visit        Future Appointments       Provider Department Appt Notes    In 1 week Jane Roman MD [facility] Dermatology lesion    In 2 weeks Li Siddiqui MD [address] [place] [address] Non-sx NO REFERRAL IN THE SYSTEM  [facility] BLUE PRECISION O - UWV50 BP HMO        Lab Results   Component Value Date    TSH 2.69 02/17/2018    [facility] 3.97 01/22/2016

## 2018-12-17 ENCOUNTER — TELEPHONE (OUTPATIENT)
Dept: INTERNAL MEDICINE CLINIC | Facility: CLINIC | Age: 48
End: 2018-12-17

## 2018-12-17 DIAGNOSIS — L98.9 SKIN LESION: Primary | ICD-10-CM

## 2018-12-17 NOTE — TELEPHONE ENCOUNTER
Pt was added to our schedule tomorrow 12/18 with Dr. Aspen Corado for a skin lesion. A referral is required.  Please advise

## 2018-12-17 NOTE — TELEPHONE ENCOUNTER
Please sign referral if you agree.  Thank you, Inderjit [facility]

## 2018-12-19 ENCOUNTER — OFFICE VISIT (OUTPATIENT)
Dept: DERMATOLOGY CLINIC | Facility: CLINIC | Age: 48
End: 2018-12-19

## 2018-12-19 DIAGNOSIS — D23.70 BENIGN NEOPLASM OF SKIN OF LOWER LIMB, INCLUDING HIP, UNSPECIFIED LATERALITY: ICD-10-CM

## 2018-12-19 DIAGNOSIS — D23.5 BENIGN NEOPLASM OF SKIN OF TRUNK, EXCEPT SCROTUM: ICD-10-CM

## 2018-12-19 DIAGNOSIS — D23.60 BENIGN NEOPLASM OF SKIN OF UPPER LIMB, INCLUDING SHOULDER, UNSPECIFIED LATERALITY: ICD-10-CM

## 2018-12-19 DIAGNOSIS — D23.4 BENIGN NEOPLASM OF SCALP AND SKIN OF NECK: ICD-10-CM

## 2018-12-19 DIAGNOSIS — D22.9 MULTIPLE NEVI: ICD-10-CM

## 2018-12-19 DIAGNOSIS — L82.1 SEBORRHEIC KERATOSES: Primary | ICD-10-CM

## 2018-12-19 DIAGNOSIS — Z86.018 HISTORY OF DYSPLASTIC NEVUS: ICD-10-CM

## 2018-12-19 DIAGNOSIS — D23.30 BENIGN NEOPLASM OF SKIN OF FACE: ICD-10-CM

## 2018-12-19 PROCEDURE — 99214 OFFICE O/P EST MOD 30 MIN: CPT | Performed by: DERMATOLOGY

## 2018-12-19 PROCEDURE — 99212 OFFICE O/P EST SF 10 MIN: CPT | Performed by: DERMATOLOGY

## 2018-12-31 NOTE — PROGRESS NOTES
Rosario Edmond is a 51year old female.   HPI:     CC:  Patient presents with:  Derm Problem: established pt. seen by APN for a lesion to vulva, biopsied and revelaed a pigmented seborrheic keratosis - pt presents here today for F/U to see if anything nee Medications:  Levothyroxine Sodium 25 MCG Oral Tab TAKE ONE TABLET BY MOUTH EVERY DAY IN THE MORNING BEFORE BREAKFAST Disp: 90 tablet Rfl: 0   FLUOXETINE HCL 40 MG Oral Cap TAKE ONE CAPSULE BY MOUTH ONE TIME DAILY  Disp: 90 capsule Rfl: 1   RAMIPRIL 5 MG O education: Not on file      Highest education level: Not on file    Social Needs      Financial resource strain: Not on file      Food insecurity - worry: Not on file      Food insecurity - inability: Not on file      Transportation needs - medical: Not on Disorder Father    • Other (Other) Father         COPD       There were no vitals filed for this visit.     HPI:    Patient presents with:  Derm Problem: established pt. seen by APN for a lesion to vulva, biopsied and revelaed a pigmented seborrheic keratos cm cerebriform at left breast.  Junctional nevi mid abdomen. 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 Multiple junctional nevi over anterior thighs.   Darker black brown papule at left posterior thigh most irregular lesion largest 5 x 6 mm clus right lower abdomen pubic area recommend observation. No significant change    Cerebriform papule at left breast observe. Nevus at right shoulder observe. Epidermal nevus reassurance. Hirsutism stable.   Discussed treatment options tash Amador

## 2019-01-25 ENCOUNTER — TELEPHONE (OUTPATIENT)
Dept: INTERNAL MEDICINE CLINIC | Facility: CLINIC | Age: 49
End: 2019-01-25

## 2019-01-25 ENCOUNTER — TELEPHONE (OUTPATIENT)
Dept: OPHTHALMOLOGY | Facility: CLINIC | Age: 49
End: 2019-01-25

## 2019-01-25 ENCOUNTER — TELEPHONE (OUTPATIENT)
Dept: OTHER | Age: 49
End: 2019-01-25

## 2019-01-25 ENCOUNTER — OFFICE VISIT (OUTPATIENT)
Dept: OPHTHALMOLOGY | Facility: CLINIC | Age: 49
End: 2019-01-25

## 2019-01-25 DIAGNOSIS — H52.13 MYOPIA OF BOTH EYES WITH ASTIGMATISM AND PRESBYOPIA: ICD-10-CM

## 2019-01-25 DIAGNOSIS — H57.9 DIFFICULTY SEEING: Primary | ICD-10-CM

## 2019-01-25 DIAGNOSIS — H16.8 NEUROTROPHIC KERATITIS: Primary | ICD-10-CM

## 2019-01-25 DIAGNOSIS — H04.123 BILATERAL DRY EYES: ICD-10-CM

## 2019-01-25 DIAGNOSIS — H54.61 VISION LOSS OF RIGHT EYE: Primary | ICD-10-CM

## 2019-01-25 DIAGNOSIS — H52.203 MYOPIA OF BOTH EYES WITH ASTIGMATISM AND PRESBYOPIA: ICD-10-CM

## 2019-01-25 DIAGNOSIS — H52.4 MYOPIA OF BOTH EYES WITH ASTIGMATISM AND PRESBYOPIA: ICD-10-CM

## 2019-01-25 PROCEDURE — 99243 OFF/OP CNSLTJ NEW/EST LOW 30: CPT | Performed by: OPHTHALMOLOGY

## 2019-01-25 PROCEDURE — 99212 OFFICE O/P EST SF 10 MIN: CPT | Performed by: OPHTHALMOLOGY

## 2019-01-25 RX ORDER — OFLOXACIN 3 MG/ML
1 SOLUTION/ DROPS OPHTHALMIC 4 TIMES DAILY
Qty: 1 BOTTLE | Refills: 0 | Status: SHIPPED | OUTPATIENT
Start: 2019-01-25 | End: 2019-03-20

## 2019-01-25 NOTE — TELEPHONE ENCOUNTER
Dr Vangie Mas, Pt called requesting a referral to see Dr Darrin Rush regarding R eye numbness and difficulty seeing with R eye. Advised pt to speak with one of your nurses and transferred call to the nurses line.  Pt is attempting to have an appointment with Dr Lopez Heading

## 2019-01-25 NOTE — TELEPHONE ENCOUNTER
Pt states that she is not seeing well out of her right eye. This just started this morning. Please advise.

## 2019-01-25 NOTE — ASSESSMENT & PLAN NOTE
Central horizontal linear abrasion decreasing vision. No pain due to corneal anesthesia. 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 Stable since then. Start Ofloxacin drops 4 times a day in the right eye and call on Monday.  Keep

## 2019-01-25 NOTE — PATIENT INSTRUCTIONS
Neurotrophic keratitis- right eye  Start Ofloxacin drops 4 times a day in the right eye and return on Monday at 4:30.

## 2019-01-25 NOTE — TELEPHONE ENCOUNTER
Pt calling with a sense of urgency in her voice requesting ophthalmology referral ASAP. States woke up this morning with vision loss and has a H/O brain tumor and nerve damage to right side of head.   Called Dr Hansen's office who will see her today but ne

## 2019-01-25 NOTE — PROGRESS NOTES
Gunnar Haines is a 51year old female. HPI:     HPI     Consult      Additional comments: Consult per Dr. Carla Antunez     EP/ 50 yo F here for urgent visit. LDE: 2/19/18   Patient complains of vision loss OD since this morning.   Jeaneth Robles Smoking status: Never Smoker      Smokeless tobacco: Never Used    Alcohol use: No      Alcohol/week: 0.0 oz    Drug use: No      Medications:    Current Outpatient Medications:  ofloxacin 0.3 % Ophthalmic Solution Place 1 drop into the right eye 4 (four) staining central epithal defect in the center  trace staining     Anterior Chamber Deep and quiet Deep and quiet    Iris Normal Normal    Lens Clear Clear    Vitreous Clear Clear            Refraction     Wearing Rx       Sphere Cylinder Axis Add    Right

## 2019-01-28 ENCOUNTER — OFFICE VISIT (OUTPATIENT)
Dept: OPHTHALMOLOGY | Facility: CLINIC | Age: 49
End: 2019-01-28

## 2019-01-28 DIAGNOSIS — H52.4 MYOPIA OF BOTH EYES WITH ASTIGMATISM AND PRESBYOPIA: ICD-10-CM

## 2019-01-28 DIAGNOSIS — H52.13 MYOPIA OF BOTH EYES WITH ASTIGMATISM AND PRESBYOPIA: ICD-10-CM

## 2019-01-28 DIAGNOSIS — G50.0 TRIGEMINAL NEURALGIA OF RIGHT SIDE OF FACE: ICD-10-CM

## 2019-01-28 DIAGNOSIS — H16.8 NEUROTROPHIC KERATITIS: Primary | ICD-10-CM

## 2019-01-28 DIAGNOSIS — H52.203 MYOPIA OF BOTH EYES WITH ASTIGMATISM AND PRESBYOPIA: ICD-10-CM

## 2019-01-28 DIAGNOSIS — H04.123 BILATERAL DRY EYES: ICD-10-CM

## 2019-01-28 PROCEDURE — 92012 INTRM OPH EXAM EST PATIENT: CPT | Performed by: OPHTHALMOLOGY

## 2019-01-28 PROCEDURE — 68761 CLOSE TEAR DUCT OPENING: CPT | Performed by: OPHTHALMOLOGY

## 2019-01-28 NOTE — ASSESSMENT & PLAN NOTE
Punctal plug inserted inferiorly. Continue Ofloxacin 2 times a day for 5 days. Artificial tears also 4 times a day, not following Ofloxacin. Having more episode of eye pain. Trigeminal neuralgia. Follow up with Dr. Panfilo Hudson.

## 2019-01-28 NOTE — ASSESSMENT & PLAN NOTE
Episodes of pain are getting more frequent. Can follow up with Dr. Jacobo Jacobs. Gabapentin prescribed by Dr. Jacobo Jacobs last year,but patient did not take it.

## 2019-01-28 NOTE — PATIENT INSTRUCTIONS
Bilateral dry eyes  Continue artificial tears 4 times a day Right eye    Neurotrophic keratitis- right eye  Punctal plug inserted inferiorly. Continue Ofloxacin 2 times a day for 5 days. Artificial tears also 4 times a day, not following Ofloxacin.  Having

## 2019-01-29 NOTE — PROGRESS NOTES
Buddy Cazares is a 51year old female. HPI:     HPI     EP/ 52 yo F here for a 3 day recheck of neurotrophic right cornea with corneal abrasion. She was last seen on 1/25/19 and was started on Ofloxacin qid OD.    POH: CN 5 paresis from brain tumor- Smoker      Smokeless tobacco: Never Used    Alcohol use: No      Alcohol/week: 0.0 oz    Drug use: No      Medications:    Current Outpatient Medications:  ofloxacin 0.3 % Ophthalmic Solution Place 1 drop into the right eye 4 (four) times daily.  Disp: 1 B ASSESSMENT/PLAN:     Diagnoses and Plan:     Bilateral dry eyes  Continue artificial tears 4 times a day Right eye    Neurotrophic keratitis- right eye  Punctal plug inserted inferiorly. Continue Ofloxacin 2 times a day for 5 days.  Artificial tears also

## 2019-03-16 NOTE — TELEPHONE ENCOUNTER
Hypertensive Medications  Protocol Criteria:  · Appointment scheduled in the past 6 months or in the next 3 months  · BMP or CMP in the past 12 months  · Creatinine result < 2  Recent Outpatient Visits            1 month ago Neurotrophic keratitis- right e

## 2019-03-16 NOTE — TELEPHONE ENCOUNTER
Recent Visits  Date Type Provider Dept   12/11/18 Office Visit Robina Garcia [facility]   02/19/18 Office Visit Diana Pickard MD [facility]   01/08/18 Office Visit Diana Pickard MD [facility]   Showing recent visits wit

## 2019-03-18 RX ORDER — RAMIPRIL 5 MG/1
CAPSULE ORAL
Qty: 90 CAPSULE | Refills: 0 | Status: SHIPPED | OUTPATIENT
Start: 2019-03-18 | End: 2019-04-18

## 2019-03-25 RX ORDER — OFLOXACIN 3 MG/ML
1 SOLUTION/ DROPS OPHTHALMIC 4 TIMES DAILY
Qty: 1 BOTTLE | Refills: 0 | Status: SHIPPED | OUTPATIENT
Start: 2019-03-25 | End: 2020-09-12 | Stop reason: ALTCHOICE

## 2019-04-18 ENCOUNTER — OFFICE VISIT (OUTPATIENT)
Dept: INTERNAL MEDICINE CLINIC | Facility: CLINIC | Age: 49
End: 2019-04-18

## 2019-04-18 VITALS
SYSTOLIC BLOOD PRESSURE: 136 MMHG | HEART RATE: 70 BPM | DIASTOLIC BLOOD PRESSURE: 87 MMHG | TEMPERATURE: 97 F | HEIGHT: 62 IN | BODY MASS INDEX: 34.71 KG/M2 | WEIGHT: 188.63 LBS

## 2019-04-18 DIAGNOSIS — R05.9 COUGH: Primary | ICD-10-CM

## 2019-04-18 PROCEDURE — 99212 OFFICE O/P EST SF 10 MIN: CPT | Performed by: INTERNAL MEDICINE

## 2019-04-18 PROCEDURE — 99213 OFFICE O/P EST LOW 20 MIN: CPT | Performed by: INTERNAL MEDICINE

## 2019-04-18 NOTE — PROGRESS NOTES
Buddy Cazares is a 51year old female. Patient presents with:  Cough      HPI:     HPI  Patient is a 13-year-old female with history of hypertension, hyperlipidemia, hypothyroidism, depression and anxiety here with complaints of cough.   Cough started i • Vitamin D deficiency       Past Surgical History:   Procedure Laterality Date   • Carpal tunnel release Left in early 1990's   • Other surgical history  2006    Clear cell meningioma, excised 11-06   • Other surgical history      orin Feb 1st 2007 tenderness and no frontal sinus tenderness. Mouth/Throat: Mucous membranes are normal. No uvula swelling. Posterior oropharyngeal erythema present. No oropharyngeal exudate. Bilateral inferior turbinates edematous.    Eyes: Conjunctivae and EOM are norm

## 2019-04-18 NOTE — PATIENT INSTRUCTIONS
Use over-the-counter Flonase in each nostril twice a day for first week, if it would rise you can use it once a day for nasal congestion. Use Allegra or Claritin daily. You can use an extra dose at night if the symptoms are severe.   Use over-the-counter

## 2019-04-23 ENCOUNTER — NURSE TRIAGE (OUTPATIENT)
Dept: OTHER | Age: 49
End: 2019-04-23

## 2019-04-23 NOTE — TELEPHONE ENCOUNTER
LMTCB=transfer to triage, please confirm if she is able to see Dr Harvey Goldstein today at [phone] am (please schedule if confirmed).

## 2019-04-23 NOTE — TELEPHONE ENCOUNTER
Dr Stan Bruno responded that she can see patient today at 65 AM in [place] [address], called patient and advised regarding the schedule, stated that she has to  her son that time, stated that she will call someone to arrange for the  and she will call us jacqueline

## 2019-04-23 NOTE — TELEPHONE ENCOUNTER
Please reply to pool: EM RN TRIAGE  Action Requested: Summary for Provider     []  Critical Lab, Recommendations Needed  [] Need Additional Advice  []   FYI    [x]   Need Orders  [] Need Medications Sent to Pharmacy  []  Other     SUMMARY: Declines to see

## 2019-04-26 NOTE — TELEPHONE ENCOUNTER
Per the patient her swelling is gone and she is good. She does not need to come in and thanked us for calling.

## 2019-06-20 ENCOUNTER — PATIENT MESSAGE (OUTPATIENT)
Dept: INTERNAL MEDICINE CLINIC | Facility: CLINIC | Age: 49
End: 2019-06-20

## 2019-06-21 ENCOUNTER — TELEPHONE (OUTPATIENT)
Dept: INTERNAL MEDICINE CLINIC | Facility: CLINIC | Age: 49
End: 2019-06-21

## 2019-06-21 DIAGNOSIS — G50.1 FACIAL PAIN, ATYPICAL: Primary | ICD-10-CM

## 2019-06-21 NOTE — TELEPHONE ENCOUNTER
----- Message from Gino Dill sent at 6/20/2019  9:57 PM CDT -----  Regarding: Referral Request  Contact: [phone]  Can I please get a referral for Dr. Prakash Herron? Having much nerve face pain.   Thank you

## 2019-06-21 NOTE — TELEPHONE ENCOUNTER
From: Buddy Cazares  To: Kraig Shah MD  Sent: 6/20/2019 9:57 PM CDT  Subject: Referral Request    Can I please get a referral for Dr. Chelsey Carpio? Having much nerve face pain.   Thank you

## 2019-06-21 NOTE — TELEPHONE ENCOUNTER
Pt states she's seen Dr. Marta Gutierrez in the past and feels she needs a referral for \"a doctor that only specializes in nerve pain. \" I informed patient that is what neurologists do and unaware of what other kind of MD she's thinking of.

## 2019-06-21 NOTE — TELEPHONE ENCOUNTER
Dr Rose Chu, please advise. Patient explained, 2006 brain tumor, nerve damage right side of face, Dr Rose Chu said that pain with nerves re-attaching.  Having \"jolts\" of pain, feeling of \"electricity\" rated \"11\" lasting brief to up to 6 minutes, starte

## 2019-06-21 NOTE — TELEPHONE ENCOUNTER
From: Trinh Zamora  To: Nayely Dawn MD  Sent: 6/20/2019 10:08 PM CDT  Subject: Referral Request    Hi. Wanting to get a referral to a doctor that deals with nerve pain. I've seen Dr. Emilia Cowan.  I want to discuss options for dealing with pain other than

## 2019-06-25 ENCOUNTER — TELEPHONE (OUTPATIENT)
Dept: NEUROLOGY | Facility: CLINIC | Age: 49
End: 2019-06-25

## 2019-06-25 DIAGNOSIS — D49.9 TUMOR: Primary | ICD-10-CM

## 2019-06-25 NOTE — TELEPHONE ENCOUNTER
Patient called to discuss neuro pain  Has it to face almost daily and lasts 6 minutes or so     Symptoms are increased, advised to see Dr. Himanshu Okeefe ( neuro) to discuss options since symptoms have increased and changed.    Offered into for , patient s

## 2019-07-15 ENCOUNTER — HOSPITAL ENCOUNTER (OUTPATIENT)
Dept: MRI IMAGING | Age: 49
Discharge: HOME | End: 2019-07-15
Attending: Other
Payer: COMMERCIAL

## 2019-07-15 DIAGNOSIS — D49.9 TUMOR: ICD-10-CM

## 2019-07-15 PROCEDURE — A9575 INJ GADOTERATE MEGLUMI 0.1ML: HCPCS | Performed by: OTHER

## 2019-07-15 PROCEDURE — 70553 MRI BRAIN STEM W/O & W/DYE: CPT | Performed by: OTHER

## 2019-07-16 ENCOUNTER — TELEPHONE (OUTPATIENT)
Dept: NEUROLOGY | Facility: CLINIC | Age: 49
End: 2019-07-16

## 2019-07-17 NOTE — TELEPHONE ENCOUNTER
Left detailed message for patient notifying her of below. Asked her to call the office if she had questions. She will need to schedule yearly follow-up appointment.

## 2019-08-25 RX ORDER — FLUOXETINE HYDROCHLORIDE 40 MG/1
CAPSULE ORAL
Qty: 90 CAPSULE | Refills: 1 | Status: SHIPPED | OUTPATIENT
Start: 2019-08-25 | End: 2020-02-05

## 2019-08-25 NOTE — TELEPHONE ENCOUNTER
Refill passed per [facility] protocol.     Requested Prescriptions   Pending Prescriptions Disp Refills   • FLUOXETINE HCL 40 MG Oral Cap [Pharmacy Med Name: Fluoxetine Hcl 40 Mg Cap Auro] 90 capsule 0     Sig: TAKE ONE CAPSULE BY MOUTH ONE TIME DAILY

## 2019-08-29 NOTE — TELEPHONE ENCOUNTER
Please review; protocol failed.     Requested Prescriptions     Pending Prescriptions Disp Refills   • LEVOTHYROXINE SODIUM 25 MCG Oral Tab [Pharmacy Med Name: Levothyroxine Sodium 25 Mcg Tab Sand] 90 tablet 0     Sig: TAKE ONE TABLET BY MOUTH EVERY MORNING

## 2019-08-30 RX ORDER — LEVOTHYROXINE SODIUM 0.03 MG/1
TABLET ORAL
Qty: 90 TABLET | Refills: 1 | Status: SHIPPED | OUTPATIENT
Start: 2019-08-30 | End: 2020-02-05

## 2019-09-23 ENCOUNTER — NURSE TRIAGE (OUTPATIENT)
Dept: INTERNAL MEDICINE CLINIC | Facility: CLINIC | Age: 49
End: 2019-09-23

## 2019-09-23 ENCOUNTER — OFFICE VISIT (OUTPATIENT)
Dept: INTERNAL MEDICINE CLINIC | Facility: CLINIC | Age: 49
End: 2019-09-23

## 2019-09-23 VITALS
HEIGHT: 62 IN | TEMPERATURE: 98 F | RESPIRATION RATE: 20 BRPM | WEIGHT: 198.5 LBS | DIASTOLIC BLOOD PRESSURE: 86 MMHG | BODY MASS INDEX: 36.53 KG/M2 | SYSTOLIC BLOOD PRESSURE: 144 MMHG | HEART RATE: 68 BPM

## 2019-09-23 DIAGNOSIS — I10 ESSENTIAL HYPERTENSION: Primary | ICD-10-CM

## 2019-09-23 PROCEDURE — 99213 OFFICE O/P EST LOW 20 MIN: CPT | Performed by: INTERNAL MEDICINE

## 2019-09-23 RX ORDER — SPIRONOLACTONE 25 MG/1
TABLET ORAL
COMMUNITY
Start: 2014-02-14 | End: 2019-09-23 | Stop reason: ALTCHOICE

## 2019-09-23 NOTE — TELEPHONE ENCOUNTER
Action Requested: Summary for Provider     []  Critical Lab, Recommendations Needed  [] Need Additional Advice  []   FYI    []   Need Orders  [] Need Medications Sent to Pharmacy  []  Other     SUMMARY: Pt requesting appt to evaluate her BP, reports over l

## 2019-09-23 NOTE — TELEPHONE ENCOUNTER
Patient called in stated her BP is high she was on medication before but is not now.     She stated took it and it was 145/90

## 2019-09-23 NOTE — PROGRESS NOTES
Rosario Mayito is a 47year old female. Patient presents with:  Hypertension    HPI:   Ms. Regalado Files presents this evening with concerns about hypertension. She has a history of hypertension, and was on ramipril 5 mg daily.   Last December, ramipril was SURGICAL HISTORY  2006    Clear cell meningioma, excised 11-06   • OTHER SURGICAL HISTORY      orin Feb 1st 2007      Social History:  Social History    Tobacco Use      Smoking status: Never Smoker      Smokeless tobacco: Never Used    Alcohol use: N

## 2019-10-10 ENCOUNTER — OFFICE VISIT (OUTPATIENT)
Dept: INTERNAL MEDICINE CLINIC | Facility: CLINIC | Age: 49
End: 2019-10-10

## 2019-10-10 VITALS
BODY MASS INDEX: 35.86 KG/M2 | WEIGHT: 194.88 LBS | HEART RATE: 73 BPM | RESPIRATION RATE: 18 BRPM | HEIGHT: 62 IN | DIASTOLIC BLOOD PRESSURE: 80 MMHG | SYSTOLIC BLOOD PRESSURE: 118 MMHG | TEMPERATURE: 98 F

## 2019-10-10 DIAGNOSIS — Z12.11 SCREENING FOR MALIGNANT NEOPLASM OF COLON: ICD-10-CM

## 2019-10-10 DIAGNOSIS — E04.9 GOITER: ICD-10-CM

## 2019-10-10 DIAGNOSIS — E78.2 MIXED HYPERLIPIDEMIA: ICD-10-CM

## 2019-10-10 DIAGNOSIS — E55.9 VITAMIN D DEFICIENCY: ICD-10-CM

## 2019-10-10 DIAGNOSIS — I10 ESSENTIAL HYPERTENSION: ICD-10-CM

## 2019-10-10 DIAGNOSIS — G50.0 TRIGEMINAL NEURALGIA OF RIGHT SIDE OF FACE: ICD-10-CM

## 2019-10-10 DIAGNOSIS — Z28.21 IMMUNIZATION NOT CARRIED OUT BECAUSE OF PATIENT REFUSAL: ICD-10-CM

## 2019-10-10 DIAGNOSIS — H49.21 SIXTH NERVE PALSY OF RIGHT EYE: ICD-10-CM

## 2019-10-10 DIAGNOSIS — E03.9 HYPOTHYROIDISM, UNSPECIFIED TYPE: ICD-10-CM

## 2019-10-10 DIAGNOSIS — Z00.00 ROUTINE PHYSICAL EXAMINATION: ICD-10-CM

## 2019-10-10 DIAGNOSIS — Z12.31 VISIT FOR SCREENING MAMMOGRAM: Primary | ICD-10-CM

## 2019-10-10 DIAGNOSIS — D51.9 ANEMIA DUE TO VITAMIN B12 DEFICIENCY, UNSPECIFIED B12 DEFICIENCY TYPE: ICD-10-CM

## 2019-10-10 PROCEDURE — 99396 PREV VISIT EST AGE 40-64: CPT | Performed by: INTERNAL MEDICINE

## 2019-10-10 NOTE — ASSESSMENT & PLAN NOTE
Episodic right-sided trigeminal neuralgia. Has been on gabapentin. Follows up with Dr. Zuleyka Newton. Has had intermittent pain in the eye for which she has been seen by a holistic chiropractor and she feels that the red light therapy has helped the eye pain.

## 2019-10-10 NOTE — ASSESSMENT & PLAN NOTE
Blood pressure 118/80, pulse 73, temperature 97.6 °F (36.4 °C), temperature source Oral, resp. rate 18, height 5' 2\" (1.575 m), weight 194 lb 14.4 oz (88.4 kg), last menstrual period 09/04/2019, not currently breastfeeding.   Blood pressure looks extremely

## 2019-10-10 NOTE — ASSESSMENT & PLAN NOTE
Thyroid function test have been stable on levothyroxine at 25 mcg daily. She does have a palpable goiter–nodularity bilaterally. Ultrasound thyroid has been requested.

## 2019-10-10 NOTE — PROGRESS NOTES
REASON FOR VISIT:    Sam Pemberton is a 47year old female who presents for an [address]     Pap Smear,3 Years due on 10/11/2021    Colonoscopy due on 09/01/2019      Immunization History   Administered Date(s) Administered   • TDAP 08/22 Pap Every 3 yrs age 21-65 or Pap and HPV every 5 yrs age 33-67 Pap Smear,2 Years due on 10/11/2021    Chlamydia Screening Screen Annually age<25, if sex active/on OCPs; >24 high risk No results found for: CHLAMYDIA    Colonoscopy Screen Every 10 years Co Value   01/22/2016 4.3    No flowsheet data found.     Creatinine  Annually CREATININE (mg/dL)   Date Value   08/07/2014 0.77     Creatinine (mg/dL)   Date Value   02/17/2018 0.81     CREATININE (P) (mg/dL)   Date Value   01/22/2016 0.70    No flowsheet shital Bilateral    • Neurotrophic keratitis 2006    Right    • Superficial punctate keratitis 2008    Bilateral   • Thyroid disease     medication   • Unspecified essential hypertension     medication   • Vitamin D deficiency       Past Surgical History:   Proce °F (36.4 °C) (Oral)   Resp 18   Ht 5' 2\" (1.575 m)   Wt 194 lb 14.4 oz (88.4 kg)   LMP 09/04/2019 (Within Days)   Breastfeeding? No   BMI 35.65 kg/m²    Patient's last menstrual period was 09/04/2019 (within days).    GENERAL: well developed, well nourishe starting back on medication if any recurrent elevation. As the blood pressure elevation seems to be related to severe anxiety disorder from situational stressors–will consider starting on medications for management of the underlying causes. side of the face. She does have some deviation of the jaw to the right side due to the nerve damage. She continues to have some eye pain on an off-and-on basis. She has been seen by Dr. Vicenta Aldridge and Dr. Brittney Godinez.            Trigeminal neuralgia of right si GASTRO - INTERNAL    Goiter  -     US THYROID ([id number]);  Future    Screening for malignant neoplasm of colon  -     GASTRO - INTERNAL    Immunization not carried out because of patient refusal  -     INFLUENZA REFUSED DMG       The patient indicates unde

## 2019-10-10 NOTE — PATIENT INSTRUCTIONS
Problem List Items Addressed This Visit        Unprioritized    Essential hypertension     Blood pressure 118/80, pulse 73, temperature 97.6 °F (36.4 °C), temperature source Oral, resp.  rate 18, height 5' 2\" (1.575 m), weight 194 lb 14.4 oz (88.4 kg), las deferred as patient is due for colonoscopy, referral for gastroenterology provided. Advised to call [phone] for an appointment with Dr. Dyllan Lehman, Dr. Goldy Costa, Dr. Doroteo Dickens, Dr. Ronel Allison, Dr. Ronni Ash.   Immunizations-    Immunization History   Administered Date(s

## 2019-10-10 NOTE — ASSESSMENT & PLAN NOTE
Lipid panel has been stable on diet control alone but patient has not been watching his diet recently. Recheck labs ordered at this time and will follow-up after completion.

## 2019-11-16 ENCOUNTER — NURSE TRIAGE (OUTPATIENT)
Dept: INTERNAL MEDICINE CLINIC | Facility: CLINIC | Age: 49
End: 2019-11-16

## 2019-11-16 NOTE — TELEPHONE ENCOUNTER
Action Requested: Summary for Provider     []  Critical Lab, Recommendations Needed  [] Need Additional Advice  []   FYI    []   Need Orders  [] Need Medications Sent to Pharmacy  []  Other     SUMMARY: Dr. Yan Santizo on call for Joel Morrison;   Patient seeking ABX

## 2019-11-18 RX ORDER — LORATADINE 10 MG/1
10 TABLET ORAL DAILY
Qty: 30 TABLET | Refills: 0 | Status: CANCELLED | OUTPATIENT
Start: 2019-11-18

## 2019-11-18 RX ORDER — AZITHROMYCIN 250 MG/1
TABLET, FILM COATED ORAL
Qty: 1 PACKAGE | Refills: 0 | Status: SHIPPED | OUTPATIENT
Start: 2019-11-18 | End: 2020-01-02

## 2019-11-18 NOTE — TELEPHONE ENCOUNTER
Z-Abdi as directed. Claritin 10 mg 1 tablet once daily if not on any other allergy medication. Drink plenty of warm fluids. Call if symptoms do not improve in the next 5 to 7 days.

## 2019-11-18 NOTE — TELEPHONE ENCOUNTER
Dr. Cinthia Carbone, see scripts pended. What quantity and number of refills do you want for the claritin?

## 2019-11-18 NOTE — TELEPHONE ENCOUNTER
Spoke with the patient who requested to know the status of the medication request from below. Patient still did not want to schedule an office visit. Message routed to provider for review.      Please reply to glenn: JESSICA Santos

## 2019-11-18 NOTE — TELEPHONE ENCOUNTER
Advised patient of Dr. Barby Nichols note. Patient verbalized understanding  Z-pack sent to pharmacy. Patient stated she will take over the counter Claritin, no need to send prescription.      Patient however stated she recently heard on the news that there is

## 2019-12-26 NOTE — TELEPHONE ENCOUNTER
Please call patient tell her MRI scan reveals no recurrence of meningioma. No change in the small blood vessel in the viola. No other abnormalities. All good news.   Thank you - - -

## 2019-12-30 ENCOUNTER — HOSPITAL ENCOUNTER (OUTPATIENT)
Dept: ULTRASOUND IMAGING | Facility: HOSPITAL | Age: 49
Discharge: HOME | End: 2019-12-30
Attending: INTERNAL MEDICINE
Payer: COMMERCIAL

## 2019-12-30 DIAGNOSIS — E04.9 GOITER: ICD-10-CM

## 2019-12-30 DIAGNOSIS — Z00.00 ROUTINE PHYSICAL EXAMINATION: ICD-10-CM

## 2019-12-30 PROCEDURE — 76536 US EXAM OF HEAD AND NECK: CPT | Performed by: INTERNAL MEDICINE

## 2020-01-02 ENCOUNTER — NURSE TRIAGE (OUTPATIENT)
Dept: INTERNAL MEDICINE CLINIC | Facility: CLINIC | Age: 50
End: 2020-01-02

## 2020-01-02 RX ORDER — AZITHROMYCIN 250 MG/1
TABLET, FILM COATED ORAL
Qty: 1 PACKAGE | Refills: 0 | Status: SHIPPED | OUTPATIENT
Start: 2020-01-02 | End: 2020-01-08 | Stop reason: ALTCHOICE

## 2020-01-02 NOTE — TELEPHONE ENCOUNTER
Action Requested: Summary for Provider     []  Critical Lab, Recommendations Needed  [] Need Additional Advice  []   FYI    []   Need Orders  [x] Need Medications Sent to Pharmacy  []  Other     SUMMARY:  Patient has had a cough  for 1 month now.  Coughing

## 2020-01-02 NOTE — TELEPHONE ENCOUNTER
Left message to call back. Please transfer to triage. 1st attempt. Medication is pending please send once pt call us back  I have also sent patient a [employer] message to call us back.

## 2020-01-03 ENCOUNTER — LAB ENCOUNTER (OUTPATIENT)
Dept: LAB | Facility: HOSPITAL | Age: 50
End: 2020-01-03
Attending: INTERNAL MEDICINE
Payer: COMMERCIAL

## 2020-01-03 DIAGNOSIS — D51.9 ANEMIA DUE TO VITAMIN B12 DEFICIENCY, UNSPECIFIED B12 DEFICIENCY TYPE: ICD-10-CM

## 2020-01-03 DIAGNOSIS — Z00.00 ROUTINE PHYSICAL EXAMINATION: ICD-10-CM

## 2020-01-03 DIAGNOSIS — E03.9 HYPOTHYROIDISM, UNSPECIFIED TYPE: ICD-10-CM

## 2020-01-03 DIAGNOSIS — E55.9 VITAMIN D DEFICIENCY: ICD-10-CM

## 2020-01-03 LAB
ALBUMIN SERPL-MCNC: 3.8 G/DL (ref 3.4–5)
ALBUMIN/GLOB SERPL: 1.1 {RATIO} (ref 1–2)
ALP LIVER SERPL-CCNC: 75 U/L (ref 39–100)
ALT SERPL-CCNC: 24 U/L (ref 13–56)
ANION GAP SERPL CALC-SCNC: 12 MMOL/L (ref 0–18)
AST SERPL-CCNC: 15 U/L (ref 15–37)
BASOPHILS # BLD AUTO: 0.02 X10(3) UL (ref 0–0.2)
BASOPHILS NFR BLD AUTO: 0.4 %
BILIRUB SERPL-MCNC: 0.6 MG/DL (ref 0.1–2)
BILIRUB UR QL: NEGATIVE
BUN BLD-MCNC: 13 MG/DL (ref 7–18)
BUN/CREAT SERPL: 15.5 (ref 10–20)
CALCIUM BLD-MCNC: 8.5 MG/DL (ref 8.5–10.1)
CHLORIDE SERPL-SCNC: 107 MMOL/L (ref 98–112)
CHOLEST SMN-MCNC: 218 MG/DL (ref ?–200)
CO2 SERPL-SCNC: 19 MMOL/L (ref 21–32)
COLOR UR: YELLOW
CREAT BLD-MCNC: 0.84 MG/DL (ref 0.55–1.02)
DEPRECATED RDW RBC AUTO: 41.7 FL (ref 35.1–46.3)
EOSINOPHIL # BLD AUTO: 0.07 X10(3) UL (ref 0–0.7)
EOSINOPHIL NFR BLD AUTO: 1.6 %
ERYTHROCYTE [DISTWIDTH] IN BLOOD BY AUTOMATED COUNT: 12.5 % (ref 11–15)
GLOBULIN PLAS-MCNC: 3.4 G/DL (ref 2.8–4.4)
GLUCOSE BLD-MCNC: 89 MG/DL (ref 70–99)
GLUCOSE UR-MCNC: NEGATIVE MG/DL
HCT VFR BLD AUTO: 42.2 % (ref 35–48)
HDLC SERPL-MCNC: 72 MG/DL (ref 40–59)
HGB BLD-MCNC: 14.2 G/DL (ref 12–16)
IMM GRANULOCYTES # BLD AUTO: 0.01 X10(3) UL (ref 0–1)
IMM GRANULOCYTES NFR BLD: 0.2 %
KETONES UR-MCNC: NEGATIVE MG/DL
LDLC SERPL CALC-MCNC: 125 MG/DL (ref ?–100)
LYMPHOCYTES # BLD AUTO: 0.81 X10(3) UL (ref 1–4)
LYMPHOCYTES NFR BLD AUTO: 18.2 %
M PROTEIN MFR SERPL ELPH: 7.2 G/DL (ref 6.4–8.2)
MCH RBC QN AUTO: 30.8 PG (ref 26–34)
MCHC RBC AUTO-ENTMCNC: 33.6 G/DL (ref 31–37)
MCV RBC AUTO: 91.5 FL (ref 80–100)
MONOCYTES # BLD AUTO: 0.39 X10(3) UL (ref 0.1–1)
MONOCYTES NFR BLD AUTO: 8.8 %
NEUTROPHILS # BLD AUTO: 3.15 X10 (3) UL (ref 1.5–7.7)
NEUTROPHILS # BLD AUTO: 3.15 X10(3) UL (ref 1.5–7.7)
NEUTROPHILS NFR BLD AUTO: 70.8 %
NITRITE UR QL STRIP.AUTO: NEGATIVE
NONHDLC SERPL-MCNC: 146 MG/DL (ref ?–130)
OSMOLALITY SERPL CALC.SUM OF ELEC: 286 MOSM/KG (ref 275–295)
PATIENT FASTING Y/N/NP: YES
PATIENT FASTING Y/N/NP: YES
PH UR: 5 [PH] (ref 5–8)
PLATELET # BLD AUTO: 268 10(3)UL (ref 150–450)
POTASSIUM SERPL-SCNC: 4.3 MMOL/L (ref 3.5–5.1)
PROT UR-MCNC: NEGATIVE MG/DL
RBC # BLD AUTO: 4.61 X10(6)UL (ref 3.8–5.3)
RBC #/AREA URNS AUTO: 2 /HPF
SODIUM SERPL-SCNC: 138 MMOL/L (ref 136–145)
SP GR UR STRIP: 1.02 (ref 1–1.03)
T3FREE SERPL-MCNC: 2.26 PG/ML (ref 2.4–4.2)
T4 FREE SERPL-MCNC: 0.8 NG/DL (ref 0.8–1.7)
TRIGL SERPL-MCNC: 105 MG/DL (ref 30–149)
TSI SER-ACNC: 3.38 MIU/ML (ref 0.36–3.74)
UROBILINOGEN UR STRIP-ACNC: <2
VIT B12 SERPL-MCNC: 722 PG/ML (ref 193–986)
VLDLC SERPL CALC-MCNC: 21 MG/DL (ref 0–30)
WBC # BLD AUTO: 4.5 X10(3) UL (ref 4–11)
WBC #/AREA URNS AUTO: 3 /HPF

## 2020-01-03 PROCEDURE — 80061 LIPID PANEL: CPT

## 2020-01-03 PROCEDURE — 85025 COMPLETE CBC W/AUTO DIFF WBC: CPT

## 2020-01-03 PROCEDURE — 36415 COLL VENOUS BLD VENIPUNCTURE: CPT

## 2020-01-03 PROCEDURE — 82306 VITAMIN D 25 HYDROXY: CPT

## 2020-01-03 PROCEDURE — 84443 ASSAY THYROID STIM HORMONE: CPT

## 2020-01-03 PROCEDURE — 80053 COMPREHEN METABOLIC PANEL: CPT

## 2020-01-03 PROCEDURE — 81001 URINALYSIS AUTO W/SCOPE: CPT

## 2020-01-03 PROCEDURE — 84439 ASSAY OF FREE THYROXINE: CPT

## 2020-01-03 PROCEDURE — 82607 VITAMIN B-12: CPT

## 2020-01-03 PROCEDURE — 84481 FREE ASSAY (FT-3): CPT

## 2020-01-06 ENCOUNTER — TELEPHONE (OUTPATIENT)
Dept: INTERNAL MEDICINE CLINIC | Facility: CLINIC | Age: 50
End: 2020-01-06

## 2020-01-06 LAB — 25(OH)D3 SERPL-MCNC: 36 NG/ML (ref 30–100)

## 2020-01-06 NOTE — TELEPHONE ENCOUNTER
Patient states she is on the fifth day of the Azalia Li and she has a nagging cough that is producing thick yellow mucous, denies fever. Please advise on further recommendations.

## 2020-01-06 NOTE — TELEPHONE ENCOUNTER
Last Resulted: 01/06/20 11:37 AM        I just sent to the lab test results, this was just resulted.

## 2020-01-06 NOTE — TELEPHONE ENCOUNTER
Patient called in stating that she is on medication below and she expected to have more improvement than she is experiencing. She states that she is still very congested, has the cough. She is requesting to speak to the nurses.      CSS transferred t

## 2020-01-07 ENCOUNTER — TELEPHONE (OUTPATIENT)
Dept: OTHER | Age: 50
End: 2020-01-07

## 2020-01-07 NOTE — TELEPHONE ENCOUNTER
Condition update from 1/2/29 encounter. Pt finished zpak as prescribed by Dr Johanna Diaz, but persistent cough continues with clear to white phelgm. Duration 6 weeks. Requesting cough medication for cough. No other symptoms. Next office visit 1/8/20 with TIRSO Garcia

## 2020-01-08 ENCOUNTER — OFFICE VISIT (OUTPATIENT)
Dept: INTERNAL MEDICINE CLINIC | Facility: CLINIC | Age: 50
End: 2020-01-08

## 2020-01-08 VITALS
BODY MASS INDEX: 37.17 KG/M2 | TEMPERATURE: 98 F | DIASTOLIC BLOOD PRESSURE: 92 MMHG | OXYGEN SATURATION: 97 % | HEIGHT: 62 IN | HEART RATE: 74 BPM | SYSTOLIC BLOOD PRESSURE: 148 MMHG | WEIGHT: 202 LBS

## 2020-01-08 DIAGNOSIS — R05.9 COUGH: Primary | ICD-10-CM

## 2020-01-08 PROCEDURE — 99213 OFFICE O/P EST LOW 20 MIN: CPT | Performed by: INTERNAL MEDICINE

## 2020-01-08 RX ORDER — BENZONATATE 100 MG/1
100 CAPSULE ORAL 3 TIMES DAILY PRN
Qty: 30 CAPSULE | Refills: 1 | Status: SHIPPED | OUTPATIENT
Start: 2020-01-08 | End: 2020-09-12 | Stop reason: ALTCHOICE

## 2020-01-08 NOTE — PROGRESS NOTES
Patient ID: Rosario Edmond is a 47year old female. Patient presents with:  Cough: ongoing cough, recently finished antibiotic with no relief. Per patient cough with phlegm.         HISTORY OF PRESENT ILLNESS:   HPI  Patient presents with ongoing cough FLUOXETINE HCL 40 MG Oral Cap, TAKE ONE CAPSULE BY MOUTH ONE TIME DAILY, Disp: 90 capsule, Rfl: 1  •  ofloxacin 0.3 % Ophthalmic Solution, Place 1 drop into the right eye 4 (four) times daily.  (Patient taking differently: Place 1 drop into the right eye as No        Occupational Exposure: Not Asked        Hobby Hazards: Not Asked        Sleep Concern: Yes        Stress Concern: Not Asked        Weight Concern: Not Asked        Special Diet: Not Asked        Back Care: Not Asked        Exercise: No        Bik

## 2020-02-05 RX ORDER — FLUOXETINE HYDROCHLORIDE 40 MG/1
CAPSULE ORAL
Qty: 90 CAPSULE | Refills: 1 | Status: SHIPPED | OUTPATIENT
Start: 2020-02-05 | End: 2020-07-27

## 2020-02-05 RX ORDER — LEVOTHYROXINE SODIUM 0.03 MG/1
TABLET ORAL
Qty: 90 TABLET | Refills: 1 | Status: SHIPPED | OUTPATIENT
Start: 2020-02-05 | End: 2020-07-28

## 2020-03-27 PROBLEM — K29.00 ACUTE SUPERFICIAL GASTRITIS WITHOUT HEMORRHAGE: Status: ACTIVE | Noted: 2020-03-27

## 2020-03-27 PROBLEM — R09.81 HEAD CONGESTION: Status: ACTIVE | Noted: 2020-03-27

## 2020-03-27 RX ORDER — RAMIPRIL 5 MG/1
CAPSULE ORAL
Qty: 90 CAPSULE | Refills: 1 | Status: SHIPPED | OUTPATIENT
Start: 2020-03-27 | End: 2020-09-12 | Stop reason: ALTCHOICE

## 2020-03-27 NOTE — ASSESSMENT & PLAN NOTE
Abdominal discomfort, gassiness, bloating, loss of appetite and some feeling of knots in her belly. This again could be related to her stress or caused from stress induced gastritis. Prilosec over-the-counter–10 mg once daily as directed.   Light diet, bl

## 2020-03-27 NOTE — TELEPHONE ENCOUNTER
Patient had stopped medication year ago and would like to restart. Has  headache, and BP last taken was 146/96 P-73  Asking for refill on ramapril  5 mg one a day.  To [facility]

## 2020-03-27 NOTE — ASSESSMENT & PLAN NOTE
History of anxiety state, gradually worse at this time due to the current circumstances. She is on Prozac which she has been tolerating well. She does not want to take any additional medications–will monitor for response after conservative management.

## 2020-03-27 NOTE — TELEPHONE ENCOUNTER
Virtual/Telephone Check-In    Min Cazares verbally consents to a Virtual/Telephone Check-In service on 03/27/20. Patient understands and accepts financial responsibility for any deductible, co-insurance and/or co-pays associated with this service. feels like it is bulging. She is status post palatectomy and feels the area around that being more sensitive. Has been quite anxious with the pandemic at this time.   Has been taking Advil for the past few days but has noticed that the blood pressure is m

## 2020-03-27 NOTE — ASSESSMENT & PLAN NOTE
Blood pressure 150/95 but upon recheck was about 143/85. She is quite anxious at this time. Her earlier medications–ramipril was discontinued as she had watched her diet, lost weight and the blood pressures had dropped.   Given the current pandemic would

## 2020-03-27 NOTE — TELEPHONE ENCOUNTER
Action Requested: Summary for Provider     []  Critical Lab, Recommendations Needed  [x] Need Additional Advice  []   FYI    []   Need Orders  [x] Need Medications Sent to Pharmacy  []  Other     SUMMARY:  Ivan Martinez please see pt Mychart message below.  Pt

## 2020-03-27 NOTE — ASSESSMENT & PLAN NOTE
Sinus pressure, pressure around the eyes, feels like it is bulging. She is status post palatectomy and feels the area around that being more sensitive. Has been quite anxious with the pandemic at this time.   Has been taking Advil for the past few days bu

## 2020-05-15 ENCOUNTER — TELEPHONE (OUTPATIENT)
Dept: OPHTHALMOLOGY | Facility: CLINIC | Age: 50
End: 2020-05-15

## 2020-05-15 NOTE — TELEPHONE ENCOUNTER
Pt states her right eye is swollen states she has nerve damage from brain surgery back in 2016 so she can't tell if it is any pain please advise

## 2020-05-18 ENCOUNTER — OFFICE VISIT (OUTPATIENT)
Dept: OPHTHALMOLOGY | Facility: CLINIC | Age: 50
End: 2020-05-18

## 2020-05-18 DIAGNOSIS — H52.13 MYOPIA OF BOTH EYES WITH ASTIGMATISM: ICD-10-CM

## 2020-05-18 DIAGNOSIS — H04.121 DRY EYE SYNDROME, RIGHT: ICD-10-CM

## 2020-05-18 DIAGNOSIS — H00.11 CHALAZION OF RIGHT UPPER EYELID: Primary | ICD-10-CM

## 2020-05-18 DIAGNOSIS — H16.8 NEUROTROPHIC KERATITIS: ICD-10-CM

## 2020-05-18 DIAGNOSIS — H52.203 MYOPIA OF BOTH EYES WITH ASTIGMATISM: ICD-10-CM

## 2020-05-18 PROCEDURE — 99243 OFF/OP CNSLTJ NEW/EST LOW 30: CPT | Performed by: OPHTHALMOLOGY

## 2020-05-18 PROCEDURE — 92015 DETERMINE REFRACTIVE STATE: CPT | Performed by: OPHTHALMOLOGY

## 2020-05-18 RX ORDER — ERYTHROMYCIN 5 MG/G
OINTMENT OPHTHALMIC
Qty: 1 TUBE | Refills: 1 | Status: SHIPPED | OUTPATIENT
Start: 2020-05-18 | End: 2020-05-25

## 2020-05-18 NOTE — ASSESSMENT & PLAN NOTE
Dry eye due to Neurotrophic keratitis Right eye. Continue Thera tears as needed. Punctal plug in place.

## 2020-05-18 NOTE — ASSESSMENT & PLAN NOTE
Doing well. Punctal plug in place. Continue Thera tears as needed 4 to 6 times a day. CN 5 paresis secondary to Clear cell Meningioma. Surgical excision  11/2006.

## 2020-05-18 NOTE — ASSESSMENT & PLAN NOTE
Gentle cleansing with baby shampoo along lids and lashes 2 times a day. Warm compresses 3 times a day. Erythromicin ophthalmic ointment 3 times a day to lid x 5 days.

## 2020-05-18 NOTE — PROGRESS NOTES
Robe Coe is a 47year old female.     HPI:     HPI     Consult      Additional comments: Per Dr Komal Ring     EP/ 47year old here a complete exam and evaluation on redness, swelling and a small bump RUL OD x 3 days since Friday of    • Migraines Other         Family history of    • Obesity Other         Family history of    • Hypertension Other         Family history of    • Macular degeneration Maternal Grandmother    • Heart Disorder Father    • Other (Other) Father          Right Left    External lid edema Normal          Slit Lamp Exam       Right Left    Lids/Lashes inferior punctal plug in place, superior lid  ulceration, probable chalazion tat patient scratched Normal    Conjunctiva/Sclera Normal Normal    Cornea tin 11/2006. Chalazion of right upper eyelid  Gentle cleansing with baby shampoo along lids and lashes 2 times a day. Warm compresses 3 times a day. Erythromicin ophthalmic ointment 3 times a day to lid x 5 days.     Dry eye syndrome, right  Dry eye due to N

## 2020-05-18 NOTE — ASSESSMENT & PLAN NOTE
New glasses Rx given with increased add. Mono vision contacts prescribed. Right eye distance and left eye near. Patient will try for work.

## 2020-05-18 NOTE — PROGRESS NOTES
Anuradha Griffin is a 47year old female.     HPI:     HPI     Consult      Additional comments: Per Dr Parveen Angel     EP/ 47year old here a complete exam and evaluation on redness, swelling and a small bump RUL OD x 3 days since Friday of    • Migraines Other         Family history of    • Obesity Other         Family history of    • Hypertension Other         Family history of    • Macular degeneration Maternal Grandmother    • Heart Disorder Father    • Other (Other) Father          Full          Extraocular Movement       Right Left     Full Full          Dilation     Both eyes:  1.0% Mydriacyl and 2.5% Herber Synephrine @ 11:29 AM            Strabismus Exam     Correction:  cc    Distance Near Near +3DS N Bifocals    Ortho  X' with increased add. Mono vision contacts prescribed. Right eye distance and left eye near. Patient will try for work. Neurotrophic keratitis- right eye  Doing well. Punctal plug in place. Continue Thera tears as needed 4 to 6 times a day.  CN 5 paresis

## 2020-05-18 NOTE — PATIENT INSTRUCTIONS
Myopia of both eyes with astigmatism and presbyopia  New glasses Rx given with increased add. Mono vision contacts prescribed. Right eye distance and left eye near. Patient will try for work. Neurotrophic keratitis- right eye  Doing well.  Punctal plug

## 2020-07-09 ENCOUNTER — TELEPHONE (OUTPATIENT)
Dept: INTERNAL MEDICINE CLINIC | Facility: CLINIC | Age: 50
End: 2020-07-09

## 2020-07-11 ENCOUNTER — NURSE TRIAGE (OUTPATIENT)
Dept: INTERNAL MEDICINE CLINIC | Facility: CLINIC | Age: 50
End: 2020-07-11

## 2020-07-11 NOTE — TELEPHONE ENCOUNTER
Action Requested: Summary for Provider     []  Critical Lab, Recommendations Needed  [] Need Additional Advice  []   FYI    []   Need Orders  [] Need Medications Sent to Pharmacy  []  Other     SUMMARY: OV scheduled with available provider Dr. Alferd Snellen 7/15/20

## 2020-07-15 ENCOUNTER — TELEPHONE (OUTPATIENT)
Dept: NEUROLOGY | Facility: CLINIC | Age: 50
End: 2020-07-15

## 2020-07-15 DIAGNOSIS — R51.9 HEADACHE DISORDER: Primary | ICD-10-CM

## 2020-07-15 NOTE — TELEPHONE ENCOUNTER
Left detailed message informing patient order for Brain MRI has been approved by Dr. Stefania Jonas. Patient instructed to call back with questions. -verified in fyi

## 2020-07-19 ENCOUNTER — HOSPITAL ENCOUNTER (OUTPATIENT)
Dept: MRI IMAGING | Age: 50
Discharge: HOME | End: 2020-07-19
Attending: Other
Payer: COMMERCIAL

## 2020-07-19 DIAGNOSIS — R51.9 HEADACHE DISORDER: ICD-10-CM

## 2020-07-19 LAB — CREAT BLD-MCNC: 0.7 MG/DL (ref 0.55–1.02)

## 2020-07-19 PROCEDURE — 70553 MRI BRAIN STEM W/O & W/DYE: CPT | Performed by: OTHER

## 2020-07-19 PROCEDURE — A9575 INJ GADOTERATE MEGLUMI 0.1ML: HCPCS | Performed by: OTHER

## 2020-07-19 PROCEDURE — 82565 ASSAY OF CREATININE: CPT

## 2020-07-20 ENCOUNTER — TELEPHONE (OUTPATIENT)
Dept: NEUROLOGY | Facility: CLINIC | Age: 50
End: 2020-07-20

## 2020-07-27 ENCOUNTER — TELEPHONE (OUTPATIENT)
Dept: INTERNAL MEDICINE CLINIC | Facility: CLINIC | Age: 50
End: 2020-07-27

## 2020-07-27 RX ORDER — FLUOXETINE HYDROCHLORIDE 40 MG/1
40 CAPSULE ORAL DAILY
Qty: 90 CAPSULE | Refills: 1 | Status: SHIPPED | OUTPATIENT
Start: 2020-07-27 | End: 2020-12-12

## 2020-07-28 RX ORDER — LEVOTHYROXINE SODIUM 0.03 MG/1
TABLET ORAL
Qty: 90 TABLET | Refills: 1 | Status: SHIPPED | OUTPATIENT
Start: 2020-07-28 | End: 2020-12-12

## 2020-08-12 ENCOUNTER — TELEPHONE (OUTPATIENT)
Dept: OPHTHALMOLOGY | Facility: CLINIC | Age: 50
End: 2020-08-12

## 2020-08-12 DIAGNOSIS — H16.8 NEUROTROPHIC KERATITIS OF RIGHT EYE: Primary | ICD-10-CM

## 2020-08-12 NOTE — TELEPHONE ENCOUNTER
Pt calling for referral for Dr. Kelsey Mabry please advise     Asking can the referral be put in mychart asking can she get it for 2 visits please advise

## 2020-09-12 ENCOUNTER — OFFICE VISIT (OUTPATIENT)
Dept: INTERNAL MEDICINE CLINIC | Facility: CLINIC | Age: 50
End: 2020-09-12

## 2020-09-12 VITALS
HEART RATE: 62 BPM | DIASTOLIC BLOOD PRESSURE: 84 MMHG | RESPIRATION RATE: 16 BRPM | HEIGHT: 62 IN | WEIGHT: 198.81 LBS | BODY MASS INDEX: 36.59 KG/M2 | SYSTOLIC BLOOD PRESSURE: 132 MMHG

## 2020-09-12 DIAGNOSIS — B07.0 PLANTAR WART: Primary | ICD-10-CM

## 2020-09-12 PROCEDURE — 3079F DIAST BP 80-89 MM HG: CPT | Performed by: INTERNAL MEDICINE

## 2020-09-12 PROCEDURE — 99213 OFFICE O/P EST LOW 20 MIN: CPT | Performed by: INTERNAL MEDICINE

## 2020-09-12 PROCEDURE — 3008F BODY MASS INDEX DOCD: CPT | Performed by: INTERNAL MEDICINE

## 2020-09-12 PROCEDURE — 3075F SYST BP GE 130 - 139MM HG: CPT | Performed by: INTERNAL MEDICINE

## 2020-09-12 NOTE — PROGRESS NOTES
Anuradha Griffin is a 45year old female.  Patient presents with:  Lump: lump in bottom of right foot, has been getting more painful, on going for about a year    HPI:   For approximately 1 year she has had a small lump on the plantar aspect of her right fo Wt 198 lb 12.8 oz (90.2 kg)   LMP 09/11/2020   Breastfeeding No   BMI 36.36 kg/m²   EXTREMITIES: Small plantar wart lateral aspect proximal portion plantar right foot      ASSESSMENT AND PLAN:   1. Plantar wart  Treatment discussed.   Recommend Compound W

## 2020-09-12 NOTE — PATIENT INSTRUCTIONS
To treat the plantar wart on the bottom of your right foot, you can use Compound W or Wart off patches or solution, or even apply a small piece of duct tape over the wart. Call if no better for a referral to Dermatology or Podiatry.

## 2020-10-25 ENCOUNTER — PATIENT MESSAGE (OUTPATIENT)
Dept: INTERNAL MEDICINE CLINIC | Facility: CLINIC | Age: 50
End: 2020-10-25

## 2020-10-26 ENCOUNTER — NURSE TRIAGE (OUTPATIENT)
Dept: INTERNAL MEDICINE CLINIC | Facility: CLINIC | Age: 50
End: 2020-10-26

## 2020-10-26 NOTE — TELEPHONE ENCOUNTER
Action Requested: Summary for Provider     []  Critical Lab, Recommendations Needed  [x] Need Additional Advice  []   FYI    []   Need Orders  [] Need Medications Sent to Pharmacy  []  Other     SUMMARY: Patient states she has been more depressed in this \

## 2020-10-26 NOTE — TELEPHONE ENCOUNTER
From: Olimpia Warren  To: Libia May MD  Sent: 10/25/2020 4:38 PM CDT  Subject: Prescription Question    Hi Doctor,    I want to know if I can get an increase in my dosage for fluoxetine.      Tung Medina

## 2020-10-26 NOTE — TELEPHONE ENCOUNTER
Okay to add at the end of the day in the next 3 to 4 days. Sometime around 7 or 7:15 PM.  Doximity video visit.

## 2020-11-02 ENCOUNTER — TELEPHONE (OUTPATIENT)
Dept: INTERNAL MEDICINE CLINIC | Facility: CLINIC | Age: 50
End: 2020-11-02

## 2020-11-02 DIAGNOSIS — Z12.31 VISIT FOR SCREENING MAMMOGRAM: Primary | ICD-10-CM

## 2020-11-02 DIAGNOSIS — Z12.31 BREAST CANCER SCREENING BY MAMMOGRAM: ICD-10-CM

## 2020-11-02 NOTE — TELEPHONE ENCOUNTER
Patient is requesting a in person appt with Dr. Patient stats she can do anyday after 4:30 and on and a Saturday. patient needs to discuss her medication.  Patient doesn't want a video visit       Please advise

## 2020-11-02 NOTE — TELEPHONE ENCOUNTER
Paulina Dia, states that the patient phoned them to schedule a routine mammogram. There are no orders in the system. Please, call when the order is entered.

## 2020-11-02 NOTE — TELEPHONE ENCOUNTER
I do not know if we have any appointments available-please check and schedule for the next open appointment

## 2020-11-02 NOTE — TELEPHONE ENCOUNTER
Dr. Amadou Spicer, patient is requesting a mammogram order. Last mammogram was completed 01/09/2018. Please advise on order.

## 2020-11-03 ENCOUNTER — TELEPHONE (OUTPATIENT)
Dept: INTERNAL MEDICINE CLINIC | Facility: CLINIC | Age: 50
End: 2020-11-03

## 2020-11-03 NOTE — TELEPHONE ENCOUNTER
Phoned the patient and made an appt for her to see Dr. Dakotah Calix on 12-29-20 at 5:00 no sooner appointments available after 4:30 or Saturday.  Pt was also added to a wait list.

## 2020-11-12 ENCOUNTER — OFFICE VISIT (OUTPATIENT)
Dept: INTERNAL MEDICINE CLINIC | Facility: CLINIC | Age: 50
End: 2020-11-12

## 2020-11-12 VITALS
DIASTOLIC BLOOD PRESSURE: 95 MMHG | HEART RATE: 69 BPM | HEIGHT: 62 IN | SYSTOLIC BLOOD PRESSURE: 148 MMHG | BODY MASS INDEX: 37.29 KG/M2 | RESPIRATION RATE: 22 BRPM | WEIGHT: 202.63 LBS | TEMPERATURE: 98 F

## 2020-11-12 DIAGNOSIS — E78.2 MIXED HYPERLIPIDEMIA: ICD-10-CM

## 2020-11-12 DIAGNOSIS — E55.9 VITAMIN D DEFICIENCY: ICD-10-CM

## 2020-11-12 DIAGNOSIS — E03.9 HYPOTHYROIDISM, UNSPECIFIED TYPE: ICD-10-CM

## 2020-11-12 DIAGNOSIS — I10 ESSENTIAL HYPERTENSION: Primary | ICD-10-CM

## 2020-11-12 DIAGNOSIS — F41.1 ANXIETY STATE: ICD-10-CM

## 2020-11-12 PROCEDURE — 99214 OFFICE O/P EST MOD 30 MIN: CPT | Performed by: INTERNAL MEDICINE

## 2020-11-12 PROCEDURE — 3077F SYST BP >= 140 MM HG: CPT | Performed by: INTERNAL MEDICINE

## 2020-11-12 PROCEDURE — 3080F DIAST BP >= 90 MM HG: CPT | Performed by: INTERNAL MEDICINE

## 2020-11-12 PROCEDURE — 3008F BODY MASS INDEX DOCD: CPT | Performed by: INTERNAL MEDICINE

## 2020-11-12 RX ORDER — CLONAZEPAM 0.5 MG/1
0.5 TABLET ORAL NIGHTLY PRN
Qty: 30 TABLET | Refills: 2 | Status: SHIPPED | OUTPATIENT
Start: 2020-11-12 | End: 2021-10-18

## 2020-11-13 ENCOUNTER — TELEPHONE (OUTPATIENT)
Dept: INTERNAL MEDICINE CLINIC | Facility: CLINIC | Age: 50
End: 2020-11-13

## 2020-11-13 NOTE — PATIENT INSTRUCTIONS
Problem List Items Addressed This Visit        Unprioritized    Anxiety state     Patient has been on Prozac for management of depression. She has noticed worsening anxiety, agitation, irritability.   Sleep has been disturbed and she has been taking some m (FREE THYROXINE)    FREE T3 (TRIIODOTHYRONINE)    ASSAY, THYROID STIM HORMONE    Mixed hyperlipidemia     Lipid panel has been stable on diet control alone but has not been watching her diet lately.   We will follow-up on labs for further management concern

## 2020-11-13 NOTE — ASSESSMENT & PLAN NOTE
Lipid panel has been stable on diet control alone but has not been watching her diet lately. We will follow-up on labs for further management concerns.

## 2020-11-13 NOTE — ASSESSMENT & PLAN NOTE
Patient has been on Prozac for management of depression. She has noticed worsening anxiety, agitation, irritability. Sleep has been disturbed and she has been taking some melatonin.   She has had worsening nerve issues after her brain surgery but has not

## 2020-11-13 NOTE — ASSESSMENT & PLAN NOTE
Thyroid function test have been stable on levothyroxine at 25 mcg daily. She has tolerated the medications well but is due for recheck labs as last labs were completed in January.   We will follow-up after labs are completed for further adjustment if neces

## 2020-11-13 NOTE — ASSESSMENT & PLAN NOTE
Blood pressure (!) 148/95, pulse 69, temperature 98.1 °F (36.7 °C), temperature source Tympanic, resp. rate 22, height 5' 2\" (1.575 m), weight 202 lb 9.6 oz (91.9 kg), last menstrual period 11/02/2020, not currently breastfeeding.      Blood pressure remai

## 2020-11-13 NOTE — PROGRESS NOTES
HPI:    Patient ID: Luis Enrique Ye is a 45year old female.     Hypertension  Hyperlipidemia (last lipid panel on 01/03/20 - LDL -125, triglycerides 105, total 218)  Hypothyroidism (US thyroid on 12/30/19; last TSH on 01/03/20 - 3.380)        Hyperte breath. Cardiovascular: Negative. Negative for chest pain and palpitations. Gastrointestinal: Negative. Endocrine: Negative. Genitourinary: Negative. Musculoskeletal: Negative. Skin: Negative. Allergic/Immunologic: Negative.     Neurolo rate, regular rhythm, normal heart sounds and intact distal pulses. No murmur heard. Pulmonary/Chest: Effort normal and breath sounds normal. She has no wheezes. She has no rales. She exhibits no tenderness. Abdominal: Soft.  Bowel sounds are normal. S ASSAY, THYROID STIM HORMONE (Completed)    Vitamin D deficiency    Relevant Orders    VITAMIN B12 (Completed)    VITAMIN D, 25-HYDROXY (Completed)    Mixed hyperlipidemia     Lipid panel has been stable on diet control alone but has not been watching her d Referrals:  None       NM#[id number]

## 2020-11-14 ENCOUNTER — LAB ENCOUNTER (OUTPATIENT)
Dept: LAB | Age: 50
End: 2020-11-14
Attending: INTERNAL MEDICINE
Payer: COMMERCIAL

## 2020-11-14 DIAGNOSIS — E03.9 HYPOTHYROIDISM, UNSPECIFIED TYPE: ICD-10-CM

## 2020-11-14 DIAGNOSIS — E55.9 VITAMIN D DEFICIENCY: ICD-10-CM

## 2020-11-14 DIAGNOSIS — I10 ESSENTIAL HYPERTENSION: ICD-10-CM

## 2020-11-14 PROCEDURE — 81001 URINALYSIS AUTO W/SCOPE: CPT

## 2020-11-14 PROCEDURE — 82306 VITAMIN D 25 HYDROXY: CPT

## 2020-11-14 PROCEDURE — 80061 LIPID PANEL: CPT

## 2020-11-14 PROCEDURE — 84481 FREE ASSAY (FT-3): CPT

## 2020-11-14 PROCEDURE — 84443 ASSAY THYROID STIM HORMONE: CPT

## 2020-11-14 PROCEDURE — 84439 ASSAY OF FREE THYROXINE: CPT

## 2020-11-14 PROCEDURE — 82607 VITAMIN B-12: CPT

## 2020-11-14 PROCEDURE — 80053 COMPREHEN METABOLIC PANEL: CPT

## 2020-11-14 PROCEDURE — 36415 COLL VENOUS BLD VENIPUNCTURE: CPT

## 2020-11-14 PROCEDURE — 85025 COMPLETE CBC W/AUTO DIFF WBC: CPT

## 2020-11-16 ENCOUNTER — IMMUNIZATION (OUTPATIENT)
Dept: INTERNAL MEDICINE CLINIC | Facility: CLINIC | Age: 50
End: 2020-11-16

## 2020-11-16 DIAGNOSIS — Z23 NEED FOR VACCINATION: ICD-10-CM

## 2020-11-16 PROCEDURE — 90471 IMMUNIZATION ADMIN: CPT | Performed by: INTERNAL MEDICINE

## 2020-11-16 PROCEDURE — 90686 IIV4 VACC NO PRSV 0.5 ML IM: CPT | Performed by: INTERNAL MEDICINE

## 2020-11-16 NOTE — TELEPHONE ENCOUNTER
Message # [id number]         2020 04:24p   [[email or web address]]  To:  From:  MANDEEP Fontaine MD:  Phone#:  ----------------------------------------------------------------------  Mary Jo Breen [phone] RE ; WAITING ON ONE RX  1970    Paged at number :  PA

## 2020-11-18 ENCOUNTER — TELEPHONE (OUTPATIENT)
Dept: INTERNAL MEDICINE CLINIC | Facility: CLINIC | Age: 50
End: 2020-11-18

## 2020-11-18 ENCOUNTER — PATIENT MESSAGE (OUTPATIENT)
Dept: INTERNAL MEDICINE CLINIC | Facility: CLINIC | Age: 50
End: 2020-11-18

## 2020-11-19 NOTE — TELEPHONE ENCOUNTER
Verified name and . Patient reports heartburn occurring at night after starting blood pressure medication. Patient reports that she has taken Tums with no relief. She is requesting recommendations from the PCP.  Patient asking if this side effect m

## 2020-11-19 NOTE — TELEPHONE ENCOUNTER
From: Maksim Dill  To: Alex Boogie MD  Sent: 11/18/2020 10:08 PM CST  Subject: Visit Follow-up Question    Hi Doctor,  So I've started the new meds but want to mention I have really bothersome heartburn.  I recall you once saying blood pressure

## 2020-11-19 NOTE — TELEPHONE ENCOUNTER
----- Message from Licha Dill sent at 11/18/2020 10:08 PM CST -----  Regarding: Visit Follow-up Question  Contact: [phone]  Hi Doctor,  So I've started the new meds but want to mention I have really bothersome heartburn.   I recall you once [facility]

## 2020-11-20 RX ORDER — PANTOPRAZOLE SODIUM 40 MG/1
40 TABLET, DELAYED RELEASE ORAL
Qty: 30 TABLET | Refills: 0 | Status: SHIPPED | OUTPATIENT
Start: 2020-11-20 | End: 2020-12-16

## 2020-11-20 NOTE — TELEPHONE ENCOUNTER
Patient has been contacted. She takes losartan in the morning. She takes clonazepam at bedtime with about 2 bottles of water. She is advised to avoid eating or drinking anything 2 hours before bedtime.   May take the clonazepam about an hour before bedti

## 2020-11-30 ENCOUNTER — NURSE ONLY (OUTPATIENT)
Dept: GASTROENTEROLOGY | Facility: CLINIC | Age: 50
End: 2020-11-30

## 2020-11-30 NOTE — PROGRESS NOTES
Patient contacted, accepted appt with [facility] Mon 12/21, arrival 4:45pm and given directions to [facility].  Had a possible exposure to COVID on 11/18 and has pending COVID test. Denies all symptoms except runny nose, which is intermittant/chronic with aller

## 2020-11-30 NOTE — PROGRESS NOTES
Left message on Tasted Menu that both questionnaires must be completed prior to our colon phone screen today. Alternately, she may make and appt with one of our APNs.

## 2020-11-30 NOTE — PROGRESS NOTES
Forwarded to [facility]. This is patient's first colonoscopy. Meds/allergies reviewed.    5'2\"--202 lbs--BMI 37.05    Positives:  Has occasional heartburn and was just put on pantoprazole--was told it could have been due to new med clonazepam. Takes it P

## 2020-12-01 ENCOUNTER — PATIENT MESSAGE (OUTPATIENT)
Dept: INTERNAL MEDICINE CLINIC | Facility: CLINIC | Age: 50
End: 2020-12-01

## 2020-12-01 ENCOUNTER — TELEPHONE (OUTPATIENT)
Dept: INTERNAL MEDICINE CLINIC | Facility: CLINIC | Age: 50
End: 2020-12-01

## 2020-12-01 DIAGNOSIS — Z20.822 CLOSE EXPOSURE TO COVID-19 VIRUS: Primary | ICD-10-CM

## 2020-12-02 ENCOUNTER — APPOINTMENT (OUTPATIENT)
Dept: LAB | Facility: HOSPITAL | Age: 50
End: 2020-12-02
Attending: INTERNAL MEDICINE
Payer: COMMERCIAL

## 2020-12-02 DIAGNOSIS — Z20.822 CLOSE EXPOSURE TO COVID-19 VIRUS: ICD-10-CM

## 2020-12-02 NOTE — TELEPHONE ENCOUNTER
From: Nicolle Dill  To: Corrina Oglesby MD  Sent: 12/1/2020 5:46 PM CST  Subject: Other    Hi. Can I please get an order for Covid-19? I was possibly exposed by a parent of a child I watched on the 20th.  Since then, the whole family has tested posit

## 2020-12-02 NOTE — TELEPHONE ENCOUNTER
Deonte Steven MD 34 minutes ago (5:46 PM)        Hi. Can I please get an order for Covid-19? I was possibly exposed by a parent of a child I watched on the 20th.     Since then, the whole family has tested positive (both parents a

## 2020-12-02 NOTE — TELEPHONE ENCOUNTER
Contacted patient. She does have a chronic headaches, sinus problems, allergies and a runny nose. She seems to have the symptoms but a little worse sinus congestion at this time.   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

## 2020-12-04 ENCOUNTER — TELEPHONE (OUTPATIENT)
Dept: INTERNAL MEDICINE CLINIC | Facility: CLINIC | Age: 50
End: 2020-12-04

## 2020-12-04 NOTE — TELEPHONE ENCOUNTER
Calling for covid test results. Pending still. Can take 5-7 days (from 12/2/20)    Had questions. 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     Patient does have Sore throat, deandre

## 2020-12-07 NOTE — TELEPHONE ENCOUNTER
Patient Result Comments for 2019 NOVEL CORONAVIRUS SARS-COV-2 BY PCR([facility])    Viewed by Steven Casillas on 12/6/2020  9:57 PM  Written by Mikaela Garcia MD on 12/6/2020  7:56 PM  COVID-19 testing–negative.

## 2020-12-12 RX ORDER — LEVOTHYROXINE SODIUM 0.03 MG/1
TABLET ORAL
Qty: 90 TABLET | Refills: 1 | Status: SHIPPED | OUTPATIENT
Start: 2020-12-12 | End: 2021-06-30

## 2020-12-12 RX ORDER — FLUOXETINE HYDROCHLORIDE 40 MG/1
40 CAPSULE ORAL DAILY
Qty: 90 CAPSULE | Refills: 1 | Status: SHIPPED | OUTPATIENT
Start: 2020-12-12 | End: 2021-06-24

## 2020-12-16 ENCOUNTER — MED REC SCAN ONLY (OUTPATIENT)
Dept: INTERNAL MEDICINE CLINIC | Facility: CLINIC | Age: 50
End: 2020-12-16

## 2020-12-16 RX ORDER — PANTOPRAZOLE SODIUM 40 MG/1
40 TABLET, DELAYED RELEASE ORAL
Qty: 30 TABLET | Refills: 0 | Status: SHIPPED | OUTPATIENT
Start: 2020-12-16 | End: 2021-03-10

## 2020-12-19 NOTE — H&P
[facility], [facility] - Gastroenterology                                                                                                             Reason for consult: crc • Diplopia 2006   • Hyperlipidemia    • Hypothyroidism    • Lipid screening 01/19/2012   • Myopia 2006    Bilateral    • Neurotrophic keratitis 2006    Right    • Superficial punctate keratitis 2008    Bilateral   • Thyroid disease     medication   • Un 2   • Cholecalciferol (VITAMIN D-1000 MAX ST OR) Take 2 tablets by mouth daily. • Cyanocobalamin (VITAMIN B 12 OR) Take 3,000 mg by mouth daily.          Allergies:  No Known Allergies    ROS:   CONSTITUTIONAL: negative for fevers, chills, sweats and we .0 150.0 - 450.0 10(3)uL    Neutrophil Absolute Prelim 3.34 1.50 - 7.70 x10 (3) uL    Neutrophil Absolute 3.34 1.50 - 7.70 x10(3) uL    Lymphocyte Absolute 0.99 (L) 1.00 - 4.00 x10(3) uL    Monocyte Absolute 0.31 0.10 - 1.00 x10(3) uL    Eosinoph from his PCP for evaluation prior to undergoing colonoscopy for CRC screening. She denies red flags such as recent change in bm, brbpr, and/or melena. She denies a FH GI malignancy. She has not had a colonoscopy and so is now due for CRC screening.  Plan patient. I discussed the risks and benefits, including but not limited to: bleeding, perforation, infection, anesthesia complications, and even death.  Patient will be NPO after midnight and will have a person physically present at time of pick-up to drive

## 2020-12-21 ENCOUNTER — OFFICE VISIT (OUTPATIENT)
Dept: GASTROENTEROLOGY | Facility: CLINIC | Age: 50
End: 2020-12-21

## 2020-12-21 ENCOUNTER — TELEPHONE (OUTPATIENT)
Dept: GASTROENTEROLOGY | Facility: CLINIC | Age: 50
End: 2020-12-21

## 2020-12-21 VITALS
HEIGHT: 62 IN | WEIGHT: 204 LBS | HEART RATE: 76 BPM | BODY MASS INDEX: 37.54 KG/M2 | DIASTOLIC BLOOD PRESSURE: 92 MMHG | SYSTOLIC BLOOD PRESSURE: 145 MMHG

## 2020-12-21 DIAGNOSIS — Z12.11 SCREEN FOR COLON CANCER: Primary | ICD-10-CM

## 2020-12-21 DIAGNOSIS — R12 HEARTBURN: Primary | ICD-10-CM

## 2020-12-21 DIAGNOSIS — Z12.11 COLON CANCER SCREENING: ICD-10-CM

## 2020-12-21 PROCEDURE — 3080F DIAST BP >= 90 MM HG: CPT | Performed by: NURSE PRACTITIONER

## 2020-12-21 PROCEDURE — 3008F BODY MASS INDEX DOCD: CPT | Performed by: NURSE PRACTITIONER

## 2020-12-21 PROCEDURE — 99244 OFF/OP CNSLTJ NEW/EST MOD 40: CPT | Performed by: NURSE PRACTITIONER

## 2020-12-21 PROCEDURE — 3077F SYST BP >= 140 MM HG: CPT | Performed by: NURSE PRACTITIONER

## 2020-12-21 RX ORDER — SODIUM, POTASSIUM,MAG SULFATES 17.5-3.13G
SOLUTION, RECONSTITUTED, ORAL ORAL
Qty: 1 BOTTLE | Refills: 0 | Status: SHIPPED | OUTPATIENT
Start: 2020-12-21 | End: 2021-02-09

## 2020-12-21 NOTE — TELEPHONE ENCOUNTER
Scheduled for:  Colonoscopy 55675  Provider Name:  Ann Bingham  Date:  2/15/21  Location:  [facility]  Sedation:  MAC  Time: 1230pm, pt told will get call for arrival time   Prep:  suprep  Meds/Allergies Reconciled?:  Noel Fairly reviewed    Diagnosis with code

## 2020-12-21 NOTE — PATIENT INSTRUCTIONS
-continue pantoprazole 40 mg once daily  -reflux diet modification  -return to clinic in approx 8 weeks    1. Schedule colonoscopy with dr. Crystal carpenter/ [facility] [Diagnosis: crc screening]    2.  bowel prep from pharmacy ([facility])    3.  Hold losartan da [facility], [address]. All rights reserved. This information is not intended as a substitute for professional medical care. Always follow your healthcare professional's instructions.

## 2020-12-23 ENCOUNTER — TELEMEDICINE (OUTPATIENT)
Dept: INTERNAL MEDICINE CLINIC | Facility: CLINIC | Age: 50
End: 2020-12-23

## 2020-12-23 VITALS — SYSTOLIC BLOOD PRESSURE: 140 MMHG | DIASTOLIC BLOOD PRESSURE: 84 MMHG

## 2020-12-23 DIAGNOSIS — E55.9 VITAMIN D DEFICIENCY: ICD-10-CM

## 2020-12-23 DIAGNOSIS — E03.9 HYPOTHYROIDISM, UNSPECIFIED TYPE: ICD-10-CM

## 2020-12-23 DIAGNOSIS — E78.2 MIXED HYPERLIPIDEMIA: ICD-10-CM

## 2020-12-23 DIAGNOSIS — I10 ESSENTIAL HYPERTENSION: Primary | ICD-10-CM

## 2020-12-23 DIAGNOSIS — D51.9 ANEMIA DUE TO VITAMIN B12 DEFICIENCY, UNSPECIFIED B12 DEFICIENCY TYPE: ICD-10-CM

## 2020-12-23 PROCEDURE — 99214 OFFICE O/P EST MOD 30 MIN: CPT | Performed by: INTERNAL MEDICINE

## 2020-12-23 RX ORDER — LOSARTAN POTASSIUM AND HYDROCHLOROTHIAZIDE 12.5; 5 MG/1; MG/1
1 TABLET ORAL DAILY
Qty: 90 TABLET | Refills: 1 | Status: SHIPPED | OUTPATIENT
Start: 2020-12-23 | End: 2021-08-25

## 2020-12-24 ENCOUNTER — HOSPITAL ENCOUNTER (OUTPATIENT)
Dept: MAMMOGRAPHY | Facility: HOSPITAL | Age: 50
Discharge: HOME | End: 2020-12-24
Attending: INTERNAL MEDICINE
Payer: COMMERCIAL

## 2020-12-24 DIAGNOSIS — Z12.31 VISIT FOR SCREENING MAMMOGRAM: ICD-10-CM

## 2020-12-24 PROCEDURE — 77063 BREAST TOMOSYNTHESIS BI: CPT | Performed by: INTERNAL MEDICINE

## 2020-12-24 PROCEDURE — 77067 SCR MAMMO BI INCL CAD: CPT | Performed by: INTERNAL MEDICINE

## 2020-12-24 NOTE — ASSESSMENT & PLAN NOTE
TSH levels look normal.  Free T4 levels look normal.  Free T3 slightly low. Continue on levothyroxine at 25 mcg daily. Recheck labs in about 3 to 4 months.

## 2020-12-24 NOTE — ASSESSMENT & PLAN NOTE
Vitamin D levels look stable on OTC supplementation with 2000 units of vitamin D daily. Continue the same at this time.

## 2020-12-24 NOTE — ASSESSMENT & PLAN NOTE
Blood pressure 140/84, last menstrual period 11/02/2020, not currently breastfeeding. Blood pressures remain elevated. Currently on losartan at 50 mg daily which she has been taking. Reviewed her diet.   She does eat quite a bit of chips that are salt

## 2020-12-24 NOTE — ASSESSMENT & PLAN NOTE
Patient has been on diet control alone. Lipid panel shows borderline elevation in the LDL cholesterol. Advise strict diet controlled to restrict fatty foods, fried foods, desserts, sugars and nuts.   She does eat significant amount of chips which probably

## 2020-12-24 NOTE — PROGRESS NOTES
HPI:    Patient ID: Steven Casillas is a 45year old female.   Telehealth outside of [facility] [address] Verbal Consent   I conducted a telehealth visit with Steven Casillas today, 12/23/20, which was completed using two-way, real-time interacti with sorethroat. no dysphagia or weight loss) since onset. The problem is controlled. Pertinent negatives include no anxiety, chest pain, malaise/fatigue, palpitations or shortness of breath. There are no associated agents to hypertension.  Risk factors for death.  Frequency of symptoms: occasionally   Severity: mild   Sleep quality: good  Nighttime awakenings: occasional  Compliance with medications:  %            Review of Systems   Constitutional: Positive for irritability.  Negative for malaise/fatig Mouth/Throat: Oropharynx is clear and moist.   Eyes: Pupils are equal, round, and reactive to light. Conjunctivae and EOM are normal. No scleral icterus. Neck: No thyromegaly present. Cardiovascular: Normal rate.    Pulmonary/Chest: Effort normal. No remain elevated. Currently on losartan at 50 mg daily which she has been taking. Reviewed her diet. She does eat quite a bit of chips that are salted. She does have 1+ pitting edema bilateral lower extremities–visible on video visit.   Patient is advise

## 2020-12-24 NOTE — PATIENT INSTRUCTIONS
Problem List Items Addressed This Visit        Unprioritized    Essential hypertension - Primary     Blood pressure 140/84, last menstrual period 11/02/2020, not currently breastfeeding. Blood pressures remain elevated.   Currently on losartan at 50 mg vitamin D daily. Continue the same at this time.

## 2020-12-24 NOTE — ASSESSMENT & PLAN NOTE
Continue on over-the-counter B complex supplement as directed. B12 levels look normal.  We will continue to monitor.

## 2021-02-09 ENCOUNTER — PATIENT MESSAGE (OUTPATIENT)
Dept: GASTROENTEROLOGY | Facility: CLINIC | Age: 51
End: 2021-02-09

## 2021-02-09 ENCOUNTER — NURSE ONLY (OUTPATIENT)
Dept: INTERNAL MEDICINE CLINIC | Facility: CLINIC | Age: 51
End: 2021-02-09

## 2021-02-09 ENCOUNTER — TELEPHONE (OUTPATIENT)
Dept: INTERNAL MEDICINE CLINIC | Facility: CLINIC | Age: 51
End: 2021-02-09

## 2021-02-09 ENCOUNTER — TELEPHONE (OUTPATIENT)
Dept: GASTROENTEROLOGY | Facility: CLINIC | Age: 51
End: 2021-02-09

## 2021-02-09 VITALS — SYSTOLIC BLOOD PRESSURE: 117 MMHG | DIASTOLIC BLOOD PRESSURE: 81 MMHG | HEART RATE: 67 BPM

## 2021-02-09 DIAGNOSIS — I10 ESSENTIAL HYPERTENSION: Primary | ICD-10-CM

## 2021-02-09 PROCEDURE — 3079F DIAST BP 80-89 MM HG: CPT | Performed by: INTERNAL MEDICINE

## 2021-02-09 PROCEDURE — 3074F SYST BP LT 130 MM HG: CPT | Performed by: INTERNAL MEDICINE

## 2021-02-09 RX ORDER — POLYETHYLENE GLYCOL-3350 AND ELECTROLYTES WITH FLAVOR PACK 240; 5.84; 2.98; 6.72; 22.72 G/278.26G; G/278.26G; G/278.26G; G/278.26G; G/278.26G
4000 POWDER, FOR SOLUTION ORAL AS DIRECTED
Qty: 4000 ML | Refills: 0 | Status: SHIPPED | OUTPATIENT
Start: 2021-02-09 | End: 2021-10-18

## 2021-02-09 NOTE — TELEPHONE ENCOUNTER
[facility] states they only have Suprep in stock and will cost the patient $107.50 Patient states that is too expensive. I checked with [address] in [facility] and they said they have Gavilyte C and Golytely in stock. Okay to change?  Please ad

## 2021-02-09 NOTE — TELEPHONE ENCOUNTER
Spoke with patient--asking if she \"really needs to come in for b/p check? My blood pressure is 132/88 with a heart rate of 75. \"    Conception Angel Dr. Mark Perez telemedicine notes below--patient verbalizes understanding and agreement    RN visit made for today at

## 2021-02-10 RX ORDER — POLYETHYLENE GLYCOL-3350 AND ELECTROLYTES WITH FLAVOR PACK 240; 5.84; 2.98; 6.72; 22.72 G/278.26G; G/278.26G; G/278.26G; G/278.26G; G/278.26G
4000 POWDER, FOR SOLUTION ORAL AS DIRECTED
Qty: 4000 ML | Refills: 0 | OUTPATIENT
Start: 2021-02-10

## 2021-02-10 NOTE — TELEPHONE ENCOUNTER
RN contacted [address] in [facility] ([place]: [phone]) as pt requesting to place RX order there, as they have generic form Hemal Diallo for prep RX.      See TE from 02/09/2021- as Dulce Maria Pitcher was sent per ANAI Sanders e-scribnickolas but per pharmacy never rec

## 2021-02-10 NOTE — TELEPHONE ENCOUNTER
My progress note on nurse visit says that I told pt. To continue meds.  So SHAJI agreed to what I said

## 2021-02-10 NOTE — TELEPHONE ENCOUNTER
Requested Prescriptions     Pending Prescriptions Disp Refills   • PEG 3350-KCl-NaBcb-NaCl-NaSulf (GAVILYTE-C) 240 g Oral Recon Soln 4000 mL 0     Sig: Take 4,000 mL by mouth As Directed.      LOV  12/21/2020  Scheduled CLN 2/15/2021 w/ Dr Park Gallego

## 2021-02-10 NOTE — TELEPHONE ENCOUNTER
From: Celestina Dill  To: Carlie Doyle. ARACELIS Camp  Sent: 2/9/2021 5:48 PM CST  Subject: Prescription Question    I'm trying to get the prep med but in the generic form. I found it at [address] [facility] in [facility].  I need a prescription as they piyush give m

## 2021-02-12 ENCOUNTER — LAB REQUISITION (OUTPATIENT)
Dept: LAB | Facility: HOSPITAL | Age: 51
End: 2021-02-12
Payer: COMMERCIAL

## 2021-02-12 DIAGNOSIS — Z01.818 ENCOUNTER FOR OTHER PREPROCEDURAL EXAMINATION: ICD-10-CM

## 2021-02-12 LAB — SARS-COV-2 RNA RESP QL NAA+PROBE: NOT DETECTED

## 2021-02-15 ENCOUNTER — SURGERY CENTER DOCUMENTATION (OUTPATIENT)
Dept: SURGERY | Age: 51
End: 2021-02-15

## 2021-02-15 DIAGNOSIS — K64.4 EXTERNAL HEMORRHOIDS: ICD-10-CM

## 2021-02-15 PROCEDURE — 45378 DIAGNOSTIC COLONOSCOPY: CPT | Performed by: INTERNAL MEDICINE

## 2021-02-15 NOTE — PROCEDURES
COLONOSCOPY REPORT    Celsa Manzanares     1970 Age 45year old   PCP Jessica Louis MD Endoscopist Tangela Maria MD     Date of procedure: 02/15/21    Location: [address] [place] [address]  [facility])    Procedure: Colonoscopy     Pr evidence of angioectasias or inflammation. 6. ASHLIE: normal rectal tone, no masses palpated. Moderate sized external hemorrhoids. Impression:   · Internal and external hemorrhoids, otherwise normal colonoscopy without polyps.      Recommend:  · Repeat

## 2021-03-04 ENCOUNTER — TELEPHONE (OUTPATIENT)
Dept: INTERNAL MEDICINE CLINIC | Facility: CLINIC | Age: 51
End: 2021-03-04

## 2021-03-04 NOTE — TELEPHONE ENCOUNTER
----- Message from Duc Dill sent at 3/3/2021  9:50 PM CST -----  Regarding: Non-Urgent Medical Question  Contact: [phone]  Can I get tested for PCOS based on my:  excessive hair growth (hirsutism)  weight gain  Hair loss from the head  My acn

## 2021-03-10 RX ORDER — PANTOPRAZOLE SODIUM 40 MG/1
40 TABLET, DELAYED RELEASE ORAL
Qty: 30 TABLET | Refills: 2 | Status: SHIPPED | OUTPATIENT
Start: 2021-03-10 | End: 2021-06-20

## 2021-04-10 ENCOUNTER — IMMUNIZATION (OUTPATIENT)
Dept: LAB | Age: 51
End: 2021-04-10
Attending: HOSPITALIST
Payer: COMMERCIAL

## 2021-04-10 DIAGNOSIS — Z23 NEED FOR VACCINATION: Primary | ICD-10-CM

## 2021-04-10 PROCEDURE — 0001A SARSCOV2 VAC 30MCG/0.3ML IM: CPT

## 2021-05-01 ENCOUNTER — IMMUNIZATION (OUTPATIENT)
Dept: LAB | Age: 51
End: 2021-05-01
Attending: HOSPITALIST
Payer: COMMERCIAL

## 2021-05-01 DIAGNOSIS — Z23 NEED FOR VACCINATION: Primary | ICD-10-CM

## 2021-05-01 PROCEDURE — 0002A SARSCOV2 VAC 30MCG/0.3ML IM: CPT

## 2021-06-20 RX ORDER — PANTOPRAZOLE SODIUM 40 MG/1
40 TABLET, DELAYED RELEASE ORAL
Qty: 30 TABLET | Refills: 3 | Status: SHIPPED | OUTPATIENT
Start: 2021-06-20 | End: 2021-10-12

## 2021-06-24 RX ORDER — FLUOXETINE HYDROCHLORIDE 40 MG/1
CAPSULE ORAL
Qty: 90 CAPSULE | Refills: 0 | Status: SHIPPED | OUTPATIENT
Start: 2021-06-24

## 2021-06-30 RX ORDER — LEVOTHYROXINE SODIUM 0.03 MG/1
TABLET ORAL
Qty: 90 TABLET | Refills: 1 | Status: SHIPPED | OUTPATIENT
Start: 2021-06-30 | End: 2022-01-07

## 2021-06-30 NOTE — TELEPHONE ENCOUNTER
Refill passed per [facility] protocol  Requested Prescriptions   Pending Prescriptions Disp Refills    LEVOTHYROXINE SODIUM 25 MCG Oral Tab [Pharmacy Med Name: Levothyroxine Sodium 25 Mcg Tab Lupi] 90 tablet 0     Sig: TAKE ONE TABLET BY MOUTH EVERY M

## 2021-06-30 NOTE — TELEPHONE ENCOUNTER
Refill passed per [facility], [facility] protocol  Requested Prescriptions   Pending Prescriptions Disp Refills    LEVOTHYROXINE SODIUM 25 MCG Oral Tab [Pharmacy Med Name: Levothyroxine Sodium 25 Mcg Tab Lupi] 90 tablet 0     Sig: TAKE ONE TABLET BY MOUTH EVERY M

## 2021-08-23 ENCOUNTER — TELEPHONE (OUTPATIENT)
Dept: NEUROLOGY | Facility: CLINIC | Age: 51
End: 2021-08-23

## 2021-08-23 DIAGNOSIS — D32.9 MENINGIOMA (HCC): Primary | ICD-10-CM

## 2021-08-25 RX ORDER — LOSARTAN POTASSIUM AND HYDROCHLOROTHIAZIDE 12.5; 5 MG/1; MG/1
1 TABLET ORAL DAILY
Qty: 90 TABLET | Refills: 1 | Status: SHIPPED | OUTPATIENT
Start: 2021-08-25 | End: 2021-12-22 | Stop reason: RX

## 2021-08-25 RX ORDER — LOSARTAN POTASSIUM AND HYDROCHLOROTHIAZIDE 12.5; 5 MG/1; MG/1
TABLET ORAL
Qty: 90 TABLET | Refills: 0 | Status: SHIPPED | OUTPATIENT
Start: 2021-08-25 | End: 2021-10-18

## 2021-09-23 RX ORDER — FLUOXETINE HYDROCHLORIDE 40 MG/1
40 CAPSULE ORAL DAILY
Qty: 90 CAPSULE | Refills: 0 | OUTPATIENT
Start: 2021-09-23

## 2021-09-23 NOTE — TELEPHONE ENCOUNTER
Please review. Protocol failed / No protocol. CSS- please assist patient in scheduling appointment. 90 day supply with no refill already given.     Requested Prescriptions   Pending Prescriptions Disp Refills    FLUOXETINE HCL 40 MG Oral Cap [Pharmacy Med Name: Fluoxetine Hydrochloride 40 Mg Cap Nort] 90 capsule 0     Sig: TAKE ONE CAPSULE BY MOUTH ONE TIME DAILY        Psychiatric Non-Scheduled (Anti-Anxiety) Failed - 9/23/2021  1:39 PM        Failed - Appointment in last 6 or next 3 months              Future Appointments         Provider Department Appt Notes    In 2 days [facility] MRI RM2 ([address]) [facility]             Recent Outpatient Visits              7 months ago Essential hypertension    [address], [address], [place]    Nurse Only    9 months ago Essential hypertension    [facility], [address], Ronda Aguirre MD    Telemedicine    9 months ago Heartburn    [address] [facility], [facility] [place], Megan Palmer APRN    Office Visit    9 months ago     [facility] GI PROCEDURE    Nurse Only    10 months ago Essential hypertension    [address] [facility], Ronda Aguirre MD    Office Visit

## 2021-09-25 ENCOUNTER — HOSPITAL ENCOUNTER (OUTPATIENT)
Dept: MRI IMAGING | Facility: HOSPITAL | Age: 51
Discharge: HOME | End: 2021-09-25
Attending: Other
Payer: COMMERCIAL

## 2021-09-25 DIAGNOSIS — D32.9 MENINGIOMA (HCC): ICD-10-CM

## 2021-09-25 PROCEDURE — 70553 MRI BRAIN STEM W/O & W/DYE: CPT | Performed by: OTHER

## 2021-09-25 PROCEDURE — A9575 INJ GADOTERATE MEGLUMI 0.1ML: HCPCS | Performed by: OTHER

## 2021-09-27 ENCOUNTER — TELEPHONE (OUTPATIENT)
Dept: NEUROLOGY | Facility: CLINIC | Age: 51
End: 2021-09-27

## 2021-10-12 RX ORDER — PANTOPRAZOLE SODIUM 40 MG/1
40 TABLET, DELAYED RELEASE ORAL
Qty: 90 TABLET | Refills: 1 | Status: SHIPPED | OUTPATIENT
Start: 2021-10-12

## 2021-10-12 NOTE — TELEPHONE ENCOUNTER
Refill passed per [facility], [facility] protocol.     Requested Prescriptions   Pending Prescriptions Disp Refills    PANTOPRAZOLE 40 MG Oral Tab EC [Pharmacy Med Name: Pantoprazole Sodium Ec 40 Mg Tab Auro] 30 tablet 0     Sig: Take 1 tablet by mouth every morni

## 2021-10-18 ENCOUNTER — OFFICE VISIT (OUTPATIENT)
Dept: INTERNAL MEDICINE CLINIC | Facility: CLINIC | Age: 51
End: 2021-10-18

## 2021-10-18 VITALS
BODY MASS INDEX: 36.25 KG/M2 | WEIGHT: 197 LBS | RESPIRATION RATE: 16 BRPM | SYSTOLIC BLOOD PRESSURE: 120 MMHG | DIASTOLIC BLOOD PRESSURE: 83 MMHG | TEMPERATURE: 97 F | HEIGHT: 62 IN | HEART RATE: 67 BPM

## 2021-10-18 DIAGNOSIS — F32.A DEPRESSIVE DISORDER: ICD-10-CM

## 2021-10-18 DIAGNOSIS — E03.9 HYPOTHYROIDISM, UNSPECIFIED TYPE: ICD-10-CM

## 2021-10-18 DIAGNOSIS — G50.0 TRIGEMINAL NEURALGIA OF RIGHT SIDE OF FACE: ICD-10-CM

## 2021-10-18 DIAGNOSIS — J34.89 NASAL OBSTRUCTION: ICD-10-CM

## 2021-10-18 DIAGNOSIS — D32.0 BENIGN NEOPLASM OF CEREBRAL MENINGES (HCC): ICD-10-CM

## 2021-10-18 DIAGNOSIS — I10 ESSENTIAL HYPERTENSION: Primary | ICD-10-CM

## 2021-10-18 DIAGNOSIS — E78.2 MIXED HYPERLIPIDEMIA: ICD-10-CM

## 2021-10-18 DIAGNOSIS — Z12.31 VISIT FOR SCREENING MAMMOGRAM: ICD-10-CM

## 2021-10-18 PROCEDURE — 3074F SYST BP LT 130 MM HG: CPT | Performed by: INTERNAL MEDICINE

## 2021-10-18 PROCEDURE — 99214 OFFICE O/P EST MOD 30 MIN: CPT | Performed by: INTERNAL MEDICINE

## 2021-10-18 PROCEDURE — 3008F BODY MASS INDEX DOCD: CPT | Performed by: INTERNAL MEDICINE

## 2021-10-18 PROCEDURE — 3079F DIAST BP 80-89 MM HG: CPT | Performed by: INTERNAL MEDICINE

## 2021-10-18 NOTE — ASSESSMENT & PLAN NOTE
Right-sided trigeminal neuralgia, has been followed up per neurology. Symptoms seem improved after she has the nerve blocks from the dentist.  She continues to pursue holistic interventions with variable benefits.

## 2021-10-18 NOTE — ASSESSMENT & PLAN NOTE
Patient currently on levothyroxine at 25 mcg daily. Significantly overdue for labs. Reordered today and will advised to see me in the next few weeks.

## 2021-10-18 NOTE — ASSESSMENT & PLAN NOTE
Complains of nasal obstruction, she does have trigeminal neuralgia right side of the face and has had some sensory dysesthesia. No chest pain or palpitations but she does have risks with regards to heart disease due to high blood pressure.   She feels diff

## 2021-10-18 NOTE — PROGRESS NOTES
HPI:    Patient ID: Malcom Vazquez is a 45year old female. MRI brain completed in September 2021 as follows. Impression  CONCLUSION:   1.  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 The current treatment provides moderate improvement. Compliance problems include diet and exercise. There is no history of kidney disease, CVA or PVD.  Identifiable causes of hypertension include a t 05/01/2021      FLULAVAL 6 months & older 0.5 ml Prefilled syringe ([id number])                          11/16/2020      TDAP                  08/22/2013  12/15/2016      Tb Intradermal Test   10/21/2013             HENT: Negative. Eyes: Negative.     Respir (VITAMIN B 12 OR) Take 3,000 mg by mouth daily. • Cholecalciferol (VITAMIN D-1000 MAX ST OR) Take 2 tablets by mouth daily.        Allergies:No Known Allergies      10/18/21  0805   BP: 120/83   Pulse: 67   Resp: 16   Temp: 97.4 °F (36.3 °C)     Body ma Reflexes normal.   Psychiatric:         Attention and Perception: Perception normal. She is inattentive. Mood and Affect: Mood normal. Affect is labile. Speech: Speech is rapid and pressured.          Behavior: Behavior normal.         Thoug Temporal, resp. rate 16, height 5' 2\" (1.575 m), weight 197 lb (89.4 kg), last menstrual period 10/01/2021, not currently breastfeeding. Pressure looks stable, well controlled at this time on losartan hydrochlorothiazide 50/12.5-1 tablet daily.   She d Metabolic Panel (14)      Lipid Panel      Assay, Thyroid Stim Hormone      Urinalysis, Routine      Vitamin B12      Vitamin D, 25-Hydroxy      Influenza Vaccine Refused (Order that documents the process)      Meds This Visit:  Requested Prescriptions

## 2021-10-18 NOTE — PATIENT INSTRUCTIONS
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   She does have s next few weeks. Mixed hyperlipidemia     Lipid panel and liver function tests have been stable on diet alone.   Wt Readings from Last 6 Encounters:  10/18/21 : 197 lb (89.4 kg)  12/21/20 : 204 lb (92.5 kg)  11/12/20 : 202 lb 9.6 oz (91.9 kg)  09/1

## 2021-10-18 NOTE — ASSESSMENT & PLAN NOTE
Blood pressure 120/83, pulse 67, temperature 97.4 °F (36.3 °C), temperature source Temporal, resp. rate 16, height 5' 2\" (1.575 m), weight 197 lb (89.4 kg), last menstrual period 10/01/2021, not currently breastfeeding.      Pressure looks stable, well con

## 2021-10-18 NOTE — ASSESSMENT & PLAN NOTE
Lipid panel and liver function tests have been stable on diet alone.   Wt Readings from Last 6 Encounters:  10/18/21 : 197 lb (89.4 kg)  12/21/20 : 204 lb (92.5 kg)  11/12/20 : 202 lb 9.6 oz (91.9 kg)  09/12/20 : 198 lb 12.8 oz (90.2 kg)  01/08/20 : 202 lb

## 2021-10-18 NOTE — ASSESSMENT & PLAN NOTE
Chronic depressive disorder, has been stable on fluoxetine. Has noticed some mood fluctuations around her perimenstrual times. She is planning to start on a ketosis diet/intermittent fasting which should help with her mood fluctuations.   Will reassess th

## 2021-10-18 NOTE — ASSESSMENT & PLAN NOTE
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   She does have some chronic eye problems related to this as well as tingling and numbness on the right side of the face and tri

## 2021-10-19 ENCOUNTER — LAB ENCOUNTER (OUTPATIENT)
Dept: LAB | Facility: HOSPITAL | Age: 51
End: 2021-10-19
Attending: INTERNAL MEDICINE
Payer: COMMERCIAL

## 2021-10-19 DIAGNOSIS — I10 ESSENTIAL HYPERTENSION: ICD-10-CM

## 2021-10-19 PROCEDURE — 85025 COMPLETE CBC W/AUTO DIFF WBC: CPT

## 2021-10-19 PROCEDURE — 84443 ASSAY THYROID STIM HORMONE: CPT

## 2021-10-19 PROCEDURE — 80053 COMPREHEN METABOLIC PANEL: CPT

## 2021-10-19 PROCEDURE — 80061 LIPID PANEL: CPT

## 2021-10-19 PROCEDURE — 36415 COLL VENOUS BLD VENIPUNCTURE: CPT

## 2021-10-19 PROCEDURE — 82306 VITAMIN D 25 HYDROXY: CPT

## 2021-10-19 PROCEDURE — 82607 VITAMIN B-12: CPT

## 2021-10-19 PROCEDURE — 81001 URINALYSIS AUTO W/SCOPE: CPT

## 2021-12-08 ENCOUNTER — TELEPHONE (OUTPATIENT)
Dept: INTERNAL MEDICINE CLINIC | Facility: CLINIC | Age: 51
End: 2021-12-08

## 2021-12-08 NOTE — TELEPHONE ENCOUNTER
Patient is calling to inform PCP that she has an appt on 2/1/22 physical first available appt scheduled and has been on the waitlist.   Patient is calling to request the appt in December 2021 as requested by PCP at last appt on 10/2021. Are you able to see patient in December from 12/21 anytime open? Please advise.

## 2021-12-20 ENCOUNTER — TELEPHONE (OUTPATIENT)
Dept: INTERNAL MEDICINE CLINIC | Facility: CLINIC | Age: 51
End: 2021-12-20

## 2021-12-20 NOTE — TELEPHONE ENCOUNTER
Current Outpatient Medications   Medication Sig Dispense Refill   • losartan-hydroCHLOROthiazide 50-12.5 MG Oral Tab Take 1 tablet by mouth daily.  90 tablet 1       If possible switch to something different different, medication above is on back order

## 2021-12-22 ENCOUNTER — LAB ENCOUNTER (OUTPATIENT)
Dept: LAB | Facility: HOSPITAL | Age: 51
End: 2021-12-22
Attending: INTERNAL MEDICINE
Payer: COMMERCIAL

## 2021-12-22 DIAGNOSIS — E03.9 HYPOTHYROIDISM, UNSPECIFIED TYPE: ICD-10-CM

## 2021-12-22 PROCEDURE — 36415 COLL VENOUS BLD VENIPUNCTURE: CPT

## 2021-12-22 PROCEDURE — 84481 FREE ASSAY (FT-3): CPT

## 2021-12-22 PROCEDURE — 84439 ASSAY OF FREE THYROXINE: CPT

## 2021-12-22 PROCEDURE — 84443 ASSAY THYROID STIM HORMONE: CPT

## 2021-12-22 RX ORDER — VALSARTAN AND HYDROCHLOROTHIAZIDE 80; 12.5 MG/1; MG/1
1 TABLET, FILM COATED ORAL DAILY
Qty: 90 TABLET | Refills: 1 | Status: SHIPPED | OUTPATIENT
Start: 2021-12-22

## 2021-12-22 NOTE — TELEPHONE ENCOUNTER
May change to valsartan hydrochlorothiazide 80/12. 5-1 tablet daily. 90 tablets and 1 refill. Patient will need to be informed about the change.

## 2021-12-24 ENCOUNTER — TELEPHONE (OUTPATIENT)
Dept: INTERNAL MEDICINE CLINIC | Facility: CLINIC | Age: 51
End: 2021-12-24

## 2021-12-24 RX ORDER — FLUOXETINE HYDROCHLORIDE 20 MG/1
20 CAPSULE ORAL DAILY
Qty: 90 CAPSULE | Refills: 0 | Status: SHIPPED | OUTPATIENT
Start: 2021-12-24 | End: 2021-12-24

## 2021-12-24 RX ORDER — FLUOXETINE HYDROCHLORIDE 20 MG/1
20 CAPSULE ORAL DAILY
Qty: 90 CAPSULE | Refills: 0 | Status: SHIPPED | OUTPATIENT
Start: 2021-12-24

## 2021-12-24 NOTE — TELEPHONE ENCOUNTER
The patient calling to state the fluoxetine does not come in 60 mg. They are asking for one 40 mg  And one 20 mg     Discussed with Dr Lul Roy who stated to order Fluoxetine 20 mg for the patient has 40 mg at home and call the patient to inform.       I una

## 2021-12-28 ENCOUNTER — HOSPITAL ENCOUNTER (OUTPATIENT)
Dept: MAMMOGRAPHY | Facility: HOSPITAL | Age: 51
Discharge: HOME | End: 2021-12-28
Attending: INTERNAL MEDICINE
Payer: COMMERCIAL

## 2021-12-28 DIAGNOSIS — Z12.31 VISIT FOR SCREENING MAMMOGRAM: ICD-10-CM

## 2021-12-28 PROCEDURE — 77063 BREAST TOMOSYNTHESIS BI: CPT | Performed by: INTERNAL MEDICINE

## 2021-12-28 PROCEDURE — 77067 SCR MAMMO BI INCL CAD: CPT | Performed by: INTERNAL MEDICINE

## 2022-01-06 ENCOUNTER — TELEPHONE (OUTPATIENT)
Dept: INTERNAL MEDICINE CLINIC | Facility: CLINIC | Age: 52
End: 2022-01-06

## 2022-01-06 NOTE — TELEPHONE ENCOUNTER
Pt calls,  tested positive for Covid on 1/2/22. Pt has a headache, no other symptoms. Has had testing done, negative. Calling for cdc clarification. Fully vaccinated, no booster yet. To call back if symptoms develop or worsen.     If You Wer

## 2022-01-07 RX ORDER — LEVOTHYROXINE SODIUM 0.03 MG/1
TABLET ORAL
Qty: 90 TABLET | Refills: 0 | Status: SHIPPED | OUTPATIENT
Start: 2022-01-07

## 2022-01-18 ENCOUNTER — OFFICE VISIT (OUTPATIENT)
Dept: OTOLARYNGOLOGY | Facility: CLINIC | Age: 52
End: 2022-01-18
Payer: COMMERCIAL

## 2022-01-18 DIAGNOSIS — Z91.09 ENVIRONMENTAL ALLERGIES: ICD-10-CM

## 2022-01-18 DIAGNOSIS — R09.81 NASAL CONGESTION: Primary | ICD-10-CM

## 2022-01-18 PROCEDURE — 99243 OFF/OP CNSLTJ NEW/EST LOW 30: CPT | Performed by: OTOLARYNGOLOGY

## 2022-01-18 RX ORDER — FLUTICASONE PROPIONATE 50 MCG
2 SPRAY, SUSPENSION (ML) NASAL DAILY
Qty: 3 EACH | Refills: 4 | Status: SHIPPED | OUTPATIENT
Start: 2022-01-18

## 2022-01-18 NOTE — PROGRESS NOTES
Jennifer Johnson is a 45year old female. Patient presents with:  Nose Problem: Difficulty breathing.  Pt presents Hx of brain tumor surgery 11/2006  Ear Problem: Muffled hearing off and on      HISTORY OF PRESENT ILLNESS   1/18/2022  This patient is partner    Other Topics      Concerns:        Caffeine Concern: No        Sleep Concern: Yes        Exercise: No        Pt has a pacemaker: No        Pt has a defibrillator: No        Reaction to local anesthetic: No      Family History   Problem Relation intolerance. Neuro Negative Tremors. Psych Negative Anxiety and depression. Integumentary Negative Frequent skin infections, pigment change and rash. Hema/Lymph Negative Easy bleeding and easy bruising.      PHYSICAL EXAM    LMP 12/14/2021 (Approxim tablet (50 mcg total) by mouth before breakfast., Disp: 90 tablet, Rfl: 1  •  pantoprazole 40 MG Oral Tab EC, Take 1 tablet (40 mg total) by mouth before breakfast., Disp: 90 tablet, Rfl: 1  •  FLUOXETINE HCL 40 MG Oral Cap, TAKE ONE CAPSULE BY MOUTH ONE T

## 2022-01-27 ENCOUNTER — TELEPHONE (OUTPATIENT)
Dept: OTOLARYNGOLOGY | Facility: CLINIC | Age: 52
End: 2022-01-27

## 2022-01-28 ENCOUNTER — LAB ENCOUNTER (OUTPATIENT)
Dept: LAB | Facility: HOSPITAL | Age: 52
End: 2022-01-28
Attending: OTOLARYNGOLOGY
Payer: COMMERCIAL

## 2022-01-28 ENCOUNTER — HOSPITAL ENCOUNTER (OUTPATIENT)
Dept: CT IMAGING | Facility: HOSPITAL | Age: 52
Discharge: HOME | End: 2022-01-28
Attending: OTOLARYNGOLOGY
Payer: COMMERCIAL

## 2022-01-28 DIAGNOSIS — R09.81 NASAL CONGESTION: ICD-10-CM

## 2022-01-28 DIAGNOSIS — Z91.09 ENVIRONMENTAL ALLERGIES: ICD-10-CM

## 2022-01-28 PROCEDURE — 86003 ALLG SPEC IGE CRUDE XTRC EA: CPT

## 2022-01-28 PROCEDURE — 70486 CT MAXILLOFACIAL W/O DYE: CPT | Performed by: OTOLARYNGOLOGY

## 2022-01-28 PROCEDURE — 82785 ASSAY OF IGE: CPT

## 2022-01-28 PROCEDURE — 36415 COLL VENOUS BLD VENIPUNCTURE: CPT

## 2022-01-31 LAB
CLAM IGE QN: <0.1 KUA/L (ref ?–0.1)
CODFISH IGE QN: <0.1 KUA/L (ref ?–0.1)
CORN IGE QN: <0.1 KUA/L (ref ?–0.1)
COW MILK IGE QN: <0.1 KUA/L (ref ?–0.1)
EGG WHITE IGE QN: <0.1 KUA/L (ref ?–0.1)
IGE SERPL-ACNC: 15.3 KU/L (ref 2–214)
PEANUT IGE QN: <0.1 KUA/L (ref ?–0.1)
SCALLOP IGE QN: <0.1 KUA/L (ref ?–0.1)
SESAME SEED IGE QN: <0.1 KUA/L (ref ?–0.1)
SHRIMP IGE QN: <0.1 KUA/L (ref ?–0.1)
SOYBEAN IGE QN: <0.1 KUA/L (ref ?–0.1)
WALNUT IGE QN: <0.1 KUA/L (ref ?–0.1)
WHEAT IGE QN: <0.1 KUA/L (ref ?–0.1)

## 2022-02-01 ENCOUNTER — PATIENT MESSAGE (OUTPATIENT)
Dept: OTOLARYNGOLOGY | Facility: CLINIC | Age: 52
End: 2022-02-01

## 2022-02-01 LAB
A ALTERNATA IGE QN: 0.98 KUA/L (ref ?–0.1)
A FUMIGATUS IGE QN: <0.1 KUA/L (ref ?–0.1)
AMER SYCAMORE IGE QN: <0.1 KUA/L (ref ?–0.1)
BOXELDER IGE QN: <0.1 KUA/L (ref ?–0.1)
C HERBARUM IGE QN: <0.1 KUA/L (ref ?–0.1)
CALIF WALNUT IGE QN: <0.1 KUA/L (ref ?–0.1)
CAT DANDER IGE QN: <0.1 KUA/L (ref ?–0.1)
CMN PIGWEED IGE QN: <0.1 KUA/L (ref ?–0.1)
COMMON RAGWEED IGE QN: <0.1 KUA/L (ref ?–0.1)
COTTONWOOD IGE QN: <0.1 KUA/L (ref ?–0.1)
D FARINAE IGE QN: <0.1 KUA/L (ref ?–0.1)
D PTERONYSS IGE QN: <0.1 KUA/L (ref ?–0.1)
DOG DANDER IGE QN: <0.1 KUA/L (ref ?–0.1)
GOOSEFOOT IGE QN: <0.1 KUA/L (ref ?–0.1)
HOUSE DUST HS IGE QN: <0.1 KUA/L (ref ?–0.1)
IGE SERPL-ACNC: 15.4 KU/L (ref 2–214)
KENT BLUE GRASS IGE QN: <0.1 KUA/L (ref ?–0.1)
M RACEMOSUS IGE QN: <0.1 KUA/L (ref ?–0.1)
MARSH ELDER IGE QN: <0.1 KUA/L (ref ?–0.1)
MOUSE EPITH IGE QN: <0.1 KUA/L (ref ?–0.1)
MT JUNIPER IGE QN: <0.1 KUA/L (ref ?–0.1)
P NOTATUM IGE QN: <0.1 KUA/L (ref ?–0.1)
PECAN/HICK TREE IGE QN: <0.1 KUA/L (ref ?–0.1)
PER RYE GRASS IGE QN: <0.1 KUA/L (ref ?–0.1)
ROACH IGE QN: <0.1 KUA/L (ref ?–0.1)
SALTWORT IGE QN: <0.1 KUA/L (ref ?–0.1)
TIMOTHY IGE QN: <0.1 KUA/L (ref ?–0.1)
WHITE ELM IGE QN: <0.1 KUA/L (ref ?–0.1)
WHITE MULBERRY IGE QN: <0.1 KUA/L (ref ?–0.1)
WHITE OAK IGE QN: <0.1 KUA/L (ref ?–0.1)

## 2022-03-28 NOTE — PROGRESS NOTES
Pt. Gustavo Maddox in for a BP nurse visit. Name,  and order was verified. I had her sit while I looked over her meds and she said she was taking her BP medication regularly.   I said it was very good and to continue doing what she has been doing and I would let no

## 2022-04-09 RX ORDER — PANTOPRAZOLE SODIUM 40 MG/1
40 TABLET, DELAYED RELEASE ORAL
Qty: 90 TABLET | Refills: 1 | OUTPATIENT
Start: 2022-04-09

## 2022-04-09 NOTE — TELEPHONE ENCOUNTER
[facility].     Former patient of Dr. Everett Later   Requested Prescriptions   Pending Prescriptions Disp Refills    PANTOPRAZOLE 40 MG Oral Tab EC [Pharmacy Med Name: Pantoprazole Sodium Ec 40 Mg Tab Auro] 90 tablet 0     Sig: Take 1 tablet  by mouth before breakfast.        Gastrointestional Medication Protocol Passed - 4/9/2022  1:30 AM        Passed - Appointment in past 12 or next 3 months            Recent Outpatient Visits              2 months ago Nasal congestion    [facility] Luh Valencia MD    Office Visit    5 months ago Essential hypertension    [facility], [address], Kevin Remy MD    Office Visit    1 year ago Essential hypertension    [facility], [address], Paddy    Nurse Only    1 year ago Essential hypertension    [facility], [address], Harvey Harding MD    Telemedicine    1 year ago Heartburn    [facility], [address] [facility], Mikhail Palmer APRN    Office Visit          Future Appointments         Provider Department Appt Notes    In 2 days Toribio Gale MD Internal Medicine - [address], [address] [place] [address] Denisha luque

## 2023-01-20 ENCOUNTER — TELEPHONE (OUTPATIENT)
Dept: OPHTHALMOLOGY | Facility: CLINIC | Age: 53
End: 2023-01-20

## 2023-01-20 NOTE — TELEPHONE ENCOUNTER
After talking to pt. She is unable to get a referral external. She was able to get an appointment today with Dr. Karolina Paris. Information given.

## 2023-01-20 NOTE — TELEPHONE ENCOUNTER
Patient states her right eye is very swollen read and pus coming out. Last seen 05/2020.  Please advise

## 2023-01-26 ENCOUNTER — PATIENT MESSAGE (OUTPATIENT)
Dept: OPHTHALMOLOGY | Facility: CLINIC | Age: 53
End: 2023-01-26

## 2023-01-26 NOTE — TELEPHONE ENCOUNTER
From: Erwin Dill  To: Lelia Prather. Rudy Mendez MD  Sent: 1/26/2023 7:09 AM CST  Subject: Referral    Do you have a referral in the system yet for me to see you?     Lashonda Govea

## 2023-07-14 ENCOUNTER — TELEPHONE (OUTPATIENT)
Dept: OPHTHALMOLOGY | Facility: CLINIC | Age: 53
End: 2023-07-14

## 2023-07-14 NOTE — TELEPHONE ENCOUNTER
Patient is requesting an appointment ASAP because she broke her glasses and cannot see because she is wearing old glasses. I explained to patient that Dr. Constanza Mann has no appointments for a complete exam until August.  Patient is asking if I can please talk to Dr. Constanza Mann on Monday because she \"needs to be seen right away\". .  Patient has been seeing Dr. Sandra Caban and Dr. Ying Medina (oculo-plastics) and she is currently taking Herber-Poly-Dex 0.1% OD QID per Dr. Ying Mednia, she has a follow-up appointment with Dr. Ying Medina at the end of the week.

## 2023-07-14 NOTE — TELEPHONE ENCOUNTER
Patient would like to see Dr Georgiana Herrera before the 30 of this month because her referral will   on the 30. Please advise     Clavin ventura

## 2023-07-17 NOTE — TELEPHONE ENCOUNTER
Spoke to pt again and per [facility] ok to schedule pt on 7/21/23 @ 8:15am with [facility].

## 2023-07-17 NOTE — TELEPHONE ENCOUNTER
Pt calling states she needs to see MD today explained a referral is needed for any appts due to HMO please advise

## 2025-04-04 NOTE — ASSESSMENT & PLAN NOTE
Blood pressure 130/85, pulse 81, resp. rate 16, height 5' 2\" (1.575 m), weight 198 lb (89.8 kg), not currently breastfeeding.   Blood pressures seem well controlled on Norvasc at 2.5 mg daily but patient has been having some recurrent heartburn which seems Report given to Karyle, RN

## (undated) NOTE — LETTER
October 8, 2018    Shana Kaminski, [address]  [address]     Patient: Jesus Mcdaniel   YOB: 1970   Date of Visit: 10/8/2018       Dear Dr. Atif Syed MD:    Thank you for referring Vishnu Staley to me for evaluation.  He Family history of    • Diabetes Other         Family history of    • Migraines Other         Family history of    • Obesity Other         Family history of    • Hypertension Other         Family history of    • Macular degeneration Maternal Grandmothe Pupils       Pupils APD    Right PERRL None    Left PERRL None            Slit Lamp and Fundus Exam     External Exam       Right Left    External Normal Normal          Slit Lamp Exam       Right Left    Lids/Lashes Normal Normal    Conjunctiva/S Return in about 6 months (around 4/8/2019). 10/8/2018  Scribed by: Paul Graham MD      Min Cazares is a 51year old female.     HPI:     HPI     Consult      Additional comments: Consult per Dr. Loya Clause              Comments     EP/ 51 year o • Macular degeneration Maternal Grandmother        Social History: Social History    Tobacco Use      Smoking status: Never Smoker      Smokeless tobacco: Never Used    Alcohol use: No      Alcohol/week: 0.0 oz    Drug use: No      Medications:    Current Lids/Lashes Normal Normal    Conjunctiva/Sclera Normal Normal    Cornea trace staining trace staining     Anterior Chamber Deep and quiet Deep and quiet    Iris Normal Normal    Lens Clear Clear    Vitreous Clear Clear          Fundus Exam       Right Lef If you have questions, please do not hesitate to call me. I look forward to following Veronica Greenberg along with you. Sincerely,        Leonardo Jacobo. Lubna Jones MD        CC: No Recipients    Document electronically generated by: Leonardo Jnoes

## (undated) NOTE — LETTER
February 19, 2018    Beth Green, [address] [facility]     Patient: Rolando Bolus   YOB: 1970   Date of Visit: 2/19/2018       Dear Dr. Tone Camarena MD:    Thank you for referring Bianca White to me for evaluation. • Macular degeneration Maternal Grandmother        Social History: Smoking status: Never Smoker                                                              Smokeless tobacco: Never Used                      Alcohol use:  No                Medications:    C Lids/Lashes Normal Normal    Conjunctiva/Sclera Normal Normal    Cornea staining trace staining     Anterior Chamber Deep and quiet Deep and quiet    Iris Normal Normal    Lens Clear Clear    Vitreous Clear Clear          Fundus Exam       Right Left    D Continue tears and tear gel at bedtime. Collagen plug in right eye today.          Orders Placed This Encounter      Punctal Occlusion by Plug, Collagen - OD - Right Eye    Meds This Visit:    No prescriptions requested or ordered in this encounter     [facility]

## (undated) NOTE — LETTER
02/09/18        [place] [address]      Dear Sinan Course records indicate that you have outstanding lab work and or testing that was ordered for you and has not yet been completed:          MINAL GILES Differential

## (undated) NOTE — LETTER
May 18, 2020    Liya Duke MD  [address] [facility]     Patient: Trinh Zamora   YOB: 1970   Date of Visit: 5/18/2020       Dear Dr. Sharyle Mealing, MD:    Thank you for referring Sophia Vargaso to me for evaluation.  Here